# Patient Record
Sex: FEMALE | Race: BLACK OR AFRICAN AMERICAN | NOT HISPANIC OR LATINO | Employment: FULL TIME | URBAN - METROPOLITAN AREA
[De-identification: names, ages, dates, MRNs, and addresses within clinical notes are randomized per-mention and may not be internally consistent; named-entity substitution may affect disease eponyms.]

---

## 2016-11-15 LAB
EXTERNAL ABO GROUPING: NORMAL
EXTERNAL HEPATITIS B SURFACE ANTIGEN: NEGATIVE
EXTERNAL RH FACTOR: POSITIVE
EXTERNAL RUBELLA QUALITATIVE: NORMAL
EXTERNAL SYPHILIS RPR SCREEN: NORMAL
HIV1 AB SPEC QL IA.RAPID: NEGATIVE

## 2017-05-03 ENCOUNTER — HOSPITAL ENCOUNTER (INPATIENT)
Facility: HOSPITAL | Age: 22
LOS: 4 days | Discharge: HOME | End: 2017-05-07
Attending: OBSTETRICS & GYNECOLOGY | Admitting: OBSTETRICS & GYNECOLOGY

## 2017-05-03 ENCOUNTER — ANESTHESIA EVENT (OUTPATIENT)
Dept: LABOR AND DELIVERY | Facility: HOSPITAL | Age: 22
End: 2017-05-03

## 2017-05-03 ENCOUNTER — ANESTHESIA (OUTPATIENT)
Dept: LABOR AND DELIVERY | Facility: HOSPITAL | Age: 22
End: 2017-05-03

## 2017-05-03 DIAGNOSIS — R10.9 ABDOMINAL PAIN DURING PREGNANCY IN THIRD TRIMESTER: ICD-10-CM

## 2017-05-03 DIAGNOSIS — Z3A.36 36 WEEKS GESTATION OF PREGNANCY: ICD-10-CM

## 2017-05-03 DIAGNOSIS — O36.8390 FETAL HEART RATE DECELERATIONS AFFECTING MANAGEMENT OF MOTHER: ICD-10-CM

## 2017-05-03 DIAGNOSIS — O26.893 ABDOMINAL PAIN DURING PREGNANCY IN THIRD TRIMESTER: ICD-10-CM

## 2017-05-03 PROBLEM — Z34.90 PREGNANCY: Status: ACTIVE | Noted: 2017-05-03

## 2017-05-03 LAB
ABO GROUP BLD: NORMAL
ALBUMIN SERPL-MCNC: 3.5 G/DL (ref 3.5–5.2)
ALBUMIN/GLOB SERPL: 1.1 G/DL
ALP SERPL-CCNC: 120 U/L (ref 39–117)
ALT SERPL W P-5'-P-CCNC: 11 U/L (ref 1–33)
AMPHET+METHAMPHET UR QL: NEGATIVE
ANION GAP SERPL CALCULATED.3IONS-SCNC: 11.4 MMOL/L
APTT PPP: 26.1 SECONDS (ref 22.7–35.4)
AST SERPL-CCNC: 15 U/L (ref 1–32)
ATMOSPHERIC PRESS: 747.9 MMHG
ATMOSPHERIC PRESS: 752.3 MMHG
BARBITURATES UR QL SCN: NEGATIVE
BASE EXCESS BLDCOA CALC-SCNC: -4.8 MMOL/L
BASE EXCESS BLDCOV CALC-SCNC: -4.9 MMOL/L (ref -30–30)
BASOPHILS # BLD AUTO: 0.01 10*3/MM3 (ref 0–0.2)
BASOPHILS NFR BLD AUTO: 0.1 % (ref 0–1.5)
BDY SITE: ABNORMAL
BDY SITE: ABNORMAL
BENZODIAZ UR QL SCN: NEGATIVE
BILIRUB SERPL-MCNC: 0.6 MG/DL (ref 0.1–1.2)
BLD GP AB SCN SERPL QL: NEGATIVE
BUN BLD-MCNC: 6 MG/DL (ref 6–20)
BUN/CREAT SERPL: 11.1 (ref 7–25)
CALCIUM SPEC-SCNC: 9.1 MG/DL (ref 8.6–10.5)
CANNABINOIDS SERPL QL: NEGATIVE
CHLORIDE SERPL-SCNC: 104 MMOL/L (ref 98–107)
CO2 SERPL-SCNC: 23.6 MMOL/L (ref 22–29)
COCAINE UR QL: NEGATIVE
CREAT BLD-MCNC: 0.54 MG/DL (ref 0.57–1)
DEPRECATED RDW RBC AUTO: 47.5 FL (ref 37–54)
EOSINOPHIL # BLD AUTO: 0.09 10*3/MM3 (ref 0–0.7)
EOSINOPHIL NFR BLD AUTO: 0.9 % (ref 0.3–6.2)
ERYTHROCYTE [DISTWIDTH] IN BLOOD BY AUTOMATED COUNT: 14.1 % (ref 11.7–13)
FETAL RBC/RBC BLD FC-RTO: 0 %
FIBRINOGEN PPP-MCNC: 482 MG/DL (ref 219–464)
GAS FLOW AIRWAY: 2 LPM
GAS FLOW AIRWAY: 2 LPM
GFR SERPL CREATININE-BSD FRML MDRD: >150 ML/MIN/1.73
GLOBULIN UR ELPH-MCNC: 3.1 GM/DL
GLUCOSE BLD-MCNC: 86 MG/DL (ref 65–99)
HCO3 BLDCOA-SCNC: 25.1 MMOL/L (ref 22–28)
HCO3 BLDCOV-SCNC: 20.8 MMOL/L
HCT VFR BLD AUTO: 37.4 % (ref 35.6–45.5)
HGB BLD-MCNC: 12.4 G/DL (ref 11.9–15.5)
HGB F BLD QL KLEIH BETKE: NORMAL
IMM GRANULOCYTES # BLD: 0.02 10*3/MM3 (ref 0–0.03)
IMM GRANULOCYTES NFR BLD: 0.2 % (ref 0–0.5)
INR PPP: 0.97 (ref 0.9–1.1)
LYMPHOCYTES # BLD AUTO: 2.31 10*3/MM3 (ref 0.9–4.8)
LYMPHOCYTES NFR BLD AUTO: 22.7 % (ref 19.6–45.3)
MCH RBC QN AUTO: 30.7 PG (ref 26.9–32)
MCHC RBC AUTO-ENTMCNC: 33.2 G/DL (ref 32.4–36.3)
MCV RBC AUTO: 92.6 FL (ref 80.5–98.2)
METHADONE UR QL SCN: NEGATIVE
MODALITY: ABNORMAL
MODALITY: ABNORMAL
MONOCYTES # BLD AUTO: 1.01 10*3/MM3 (ref 0.2–1.2)
MONOCYTES NFR BLD AUTO: 9.9 % (ref 5–12)
NEUTROPHILS # BLD AUTO: 6.75 10*3/MM3 (ref 1.9–8.1)
NEUTROPHILS NFR BLD AUTO: 66.2 % (ref 42.7–76)
OPIATES UR QL: NEGATIVE
OXYCODONE UR QL SCN: NEGATIVE
PCO2 BLDCOA: 72.1 MMHG
PCO2 BLDCOV: 40.4 MM HG (ref 35–51.3)
PH BLDCOA: 7.15 [PH] (ref 7.18–7.34)
PH BLDCOV: 7.32 [PH] (ref 7.26–7.4)
PLATELET # BLD AUTO: 162 10*3/MM3 (ref 140–500)
PMV BLD AUTO: 11.8 FL (ref 6–12)
PO2 BLDCOA: <22 MMHG (ref 12–26)
PO2 BLDCOV: 34.4 MM HG (ref 19–39)
POTASSIUM BLD-SCNC: 3.7 MMOL/L (ref 3.5–5.2)
PROT SERPL-MCNC: 6.6 G/DL (ref 6–8.5)
PROTHROMBIN TIME: 12.5 SECONDS (ref 11.7–14.2)
RBC # BLD AUTO: 4.04 10*6/MM3 (ref 3.9–5.2)
RH BLD: POSITIVE
SAO2 % BLDCOA: 3.8 % (ref 92–99)
SAO2 % BLDCOA: 61.5 % (ref 92–99)
SAO2 % BLDCOV: ABNORMAL %
SODIUM BLD-SCNC: 139 MMOL/L (ref 136–145)
WBC NRBC COR # BLD: 10.19 10*3/MM3 (ref 4.5–10.7)

## 2017-05-03 PROCEDURE — 85460 HEMOGLOBIN FETAL: CPT | Performed by: OBSTETRICS & GYNECOLOGY

## 2017-05-03 PROCEDURE — 80307 DRUG TEST PRSMV CHEM ANLYZR: CPT | Performed by: OBSTETRICS & GYNECOLOGY

## 2017-05-03 PROCEDURE — 25010000002 ONDANSETRON PER 1 MG: Performed by: ANESTHESIOLOGY

## 2017-05-03 PROCEDURE — 86901 BLOOD TYPING SEROLOGIC RH(D): CPT | Performed by: OBSTETRICS & GYNECOLOGY

## 2017-05-03 PROCEDURE — 86900 BLOOD TYPING SEROLOGIC ABO: CPT | Performed by: OBSTETRICS & GYNECOLOGY

## 2017-05-03 PROCEDURE — 85730 THROMBOPLASTIN TIME PARTIAL: CPT | Performed by: OBSTETRICS & GYNECOLOGY

## 2017-05-03 PROCEDURE — 85025 COMPLETE CBC W/AUTO DIFF WBC: CPT | Performed by: OBSTETRICS & GYNECOLOGY

## 2017-05-03 PROCEDURE — 25010000002 PHENYLEPHRINE PER 1 ML: Performed by: ANESTHESIOLOGY

## 2017-05-03 PROCEDURE — 85610 PROTHROMBIN TIME: CPT | Performed by: OBSTETRICS & GYNECOLOGY

## 2017-05-03 PROCEDURE — 59514 CESAREAN DELIVERY ONLY: CPT | Performed by: OBSTETRICS & GYNECOLOGY

## 2017-05-03 PROCEDURE — 82803 BLOOD GASES ANY COMBINATION: CPT

## 2017-05-03 PROCEDURE — 85384 FIBRINOGEN ACTIVITY: CPT | Performed by: OBSTETRICS & GYNECOLOGY

## 2017-05-03 PROCEDURE — 80053 COMPREHEN METABOLIC PANEL: CPT | Performed by: OBSTETRICS & GYNECOLOGY

## 2017-05-03 PROCEDURE — S0260 H&P FOR SURGERY: HCPCS | Performed by: OBSTETRICS & GYNECOLOGY

## 2017-05-03 PROCEDURE — 25010000002 MORPHINE PER 10 MG

## 2017-05-03 PROCEDURE — 86850 RBC ANTIBODY SCREEN: CPT | Performed by: OBSTETRICS & GYNECOLOGY

## 2017-05-03 RX ORDER — OXYTOCIN/RINGER'S LACTATE 10/500ML
125 PLASTIC BAG, INJECTION (ML) INTRAVENOUS CONTINUOUS PRN
Status: CANCELLED | OUTPATIENT
Start: 2017-05-03 | End: 2017-05-04

## 2017-05-03 RX ORDER — CEFAZOLIN SODIUM 2 G/100ML
INJECTION, SOLUTION INTRAVENOUS
Status: DISPENSED
Start: 2017-05-03 | End: 2017-05-04

## 2017-05-03 RX ORDER — PRENATAL VIT NO.126/IRON/FOLIC 28MG-0.8MG
1 TABLET ORAL DAILY
COMMUNITY
End: 2017-08-17

## 2017-05-03 RX ORDER — DOCUSATE SODIUM 100 MG/1
100 CAPSULE, LIQUID FILLED ORAL 2 TIMES DAILY
COMMUNITY
End: 2017-08-17

## 2017-05-03 RX ORDER — ONDANSETRON 2 MG/ML
INJECTION INTRAMUSCULAR; INTRAVENOUS AS NEEDED
Status: DISCONTINUED | OUTPATIENT
Start: 2017-05-03 | End: 2017-05-03 | Stop reason: SURG

## 2017-05-03 RX ORDER — LIDOCAINE HYDROCHLORIDE 10 MG/ML
5 INJECTION, SOLUTION INFILTRATION; PERINEURAL AS NEEDED
Status: DISCONTINUED | OUTPATIENT
Start: 2017-05-03 | End: 2017-05-04

## 2017-05-03 RX ORDER — MORPHINE SULFATE 1 MG/ML
INJECTION, SOLUTION EPIDURAL; INTRATHECAL; INTRAVENOUS
Status: COMPLETED
Start: 2017-05-03 | End: 2017-05-03

## 2017-05-03 RX ORDER — ERYTHROMYCIN 5 MG/G
OINTMENT OPHTHALMIC
Status: DISPENSED
Start: 2017-05-03 | End: 2017-05-04

## 2017-05-03 RX ORDER — CEFAZOLIN SODIUM 2 G/100ML
2 INJECTION, SOLUTION INTRAVENOUS ONCE
Status: DISCONTINUED | OUTPATIENT
Start: 2017-05-03 | End: 2017-05-04

## 2017-05-03 RX ORDER — SODIUM CHLORIDE, SODIUM LACTATE, POTASSIUM CHLORIDE, CALCIUM CHLORIDE 600; 310; 30; 20 MG/100ML; MG/100ML; MG/100ML; MG/100ML
125 INJECTION, SOLUTION INTRAVENOUS CONTINUOUS
Status: DISCONTINUED | OUTPATIENT
Start: 2017-05-03 | End: 2017-05-04

## 2017-05-03 RX ORDER — OXYTOCIN/RINGER'S LACTATE 10/500ML
999 PLASTIC BAG, INJECTION (ML) INTRAVENOUS ONCE
Status: CANCELLED | OUTPATIENT
Start: 2017-05-03 | End: 2017-05-03

## 2017-05-03 RX ORDER — CARBOPROST TROMETHAMINE 250 UG/ML
250 INJECTION, SOLUTION INTRAMUSCULAR AS NEEDED
Status: CANCELLED | OUTPATIENT
Start: 2017-05-03

## 2017-05-03 RX ORDER — PHYTONADIONE 1 MG/.5ML
INJECTION, EMULSION INTRAMUSCULAR; INTRAVENOUS; SUBCUTANEOUS
Status: DISPENSED
Start: 2017-05-03 | End: 2017-05-04

## 2017-05-03 RX ORDER — MISOPROSTOL 200 UG/1
800 TABLET ORAL AS NEEDED
Status: CANCELLED | OUTPATIENT
Start: 2017-05-03

## 2017-05-03 RX ORDER — METHYLERGONOVINE MALEATE 0.2 MG/ML
200 INJECTION INTRAVENOUS ONCE AS NEEDED
Status: CANCELLED | OUTPATIENT
Start: 2017-05-03

## 2017-05-03 RX ORDER — OXYTOCIN/RINGER'S LACTATE 10/500ML
PLASTIC BAG, INJECTION (ML) INTRAVENOUS CONTINUOUS PRN
Status: DISCONTINUED | OUTPATIENT
Start: 2017-05-03 | End: 2017-05-03 | Stop reason: SURG

## 2017-05-03 RX ORDER — FENTANYL CITRATE 50 UG/ML
INJECTION, SOLUTION INTRAMUSCULAR; INTRAVENOUS
Status: DISPENSED
Start: 2017-05-03 | End: 2017-05-04

## 2017-05-03 RX ORDER — SODIUM CHLORIDE 0.9 % (FLUSH) 0.9 %
1-10 SYRINGE (ML) INJECTION AS NEEDED
Status: DISCONTINUED | OUTPATIENT
Start: 2017-05-03 | End: 2017-05-04

## 2017-05-03 RX ORDER — BUPIVACAINE HYDROCHLORIDE 7.5 MG/ML
INJECTION, SOLUTION EPIDURAL; RETROBULBAR AS NEEDED
Status: DISCONTINUED | OUTPATIENT
Start: 2017-05-03 | End: 2017-05-03 | Stop reason: SURG

## 2017-05-03 RX ORDER — FERROUS SULFATE 325(65) MG
325 TABLET ORAL
COMMUNITY
End: 2017-08-17

## 2017-05-03 RX ADMIN — SODIUM CHLORIDE, POTASSIUM CHLORIDE, SODIUM LACTATE AND CALCIUM CHLORIDE 1000 ML: 600; 310; 30; 20 INJECTION, SOLUTION INTRAVENOUS at 20:55

## 2017-05-03 RX ADMIN — EPHEDRINE SULFATE 5 MG: 50 INJECTION INTRAMUSCULAR; INTRAVENOUS; SUBCUTANEOUS at 21:46

## 2017-05-03 RX ADMIN — PHENYLEPHRINE HYDROCHLORIDE 100 MCG: 10 INJECTION INTRAVENOUS at 21:51

## 2017-05-03 RX ADMIN — EPHEDRINE SULFATE 5 MG: 50 INJECTION INTRAMUSCULAR; INTRAVENOUS; SUBCUTANEOUS at 21:51

## 2017-05-03 RX ADMIN — EPHEDRINE SULFATE 5 MG: 50 INJECTION INTRAMUSCULAR; INTRAVENOUS; SUBCUTANEOUS at 21:55

## 2017-05-03 RX ADMIN — PHENYLEPHRINE HYDROCHLORIDE 100 MCG: 10 INJECTION INTRAVENOUS at 21:42

## 2017-05-03 RX ADMIN — SODIUM CHLORIDE, POTASSIUM CHLORIDE, SODIUM LACTATE AND CALCIUM CHLORIDE: 600; 310; 30; 20 INJECTION, SOLUTION INTRAVENOUS at 21:17

## 2017-05-03 RX ADMIN — PHENYLEPHRINE HYDROCHLORIDE 100 MCG: 10 INJECTION INTRAVENOUS at 21:47

## 2017-05-03 RX ADMIN — BUPIVACAINE HYDROCHLORIDE 1.8 ML: 7.5 INJECTION, SOLUTION EPIDURAL; RETROBULBAR at 21:39

## 2017-05-03 RX ADMIN — ONDANSETRON 4 MG: 2 INJECTION INTRAMUSCULAR; INTRAVENOUS at 21:59

## 2017-05-03 RX ADMIN — MORPHINE SULFATE 0.3 MG: 1 INJECTION EPIDURAL; INTRATHECAL; INTRAVENOUS at 21:39

## 2017-05-03 RX ADMIN — OXYTOCIN 150 ML/HR: 10 INJECTION, SOLUTION INTRAMUSCULAR; INTRAVENOUS at 21:55

## 2017-05-04 LAB
BASOPHILS # BLD AUTO: 0.01 10*3/MM3 (ref 0–0.2)
BASOPHILS NFR BLD AUTO: 0.1 % (ref 0–1.5)
DEPRECATED RDW RBC AUTO: 47.4 FL (ref 37–54)
EOSINOPHIL # BLD AUTO: 0.04 10*3/MM3 (ref 0–0.7)
EOSINOPHIL NFR BLD AUTO: 0.3 % (ref 0.3–6.2)
ERYTHROCYTE [DISTWIDTH] IN BLOOD BY AUTOMATED COUNT: 14.1 % (ref 11.7–13)
HCT VFR BLD AUTO: 35.3 % (ref 35.6–45.5)
HGB BLD-MCNC: 11.6 G/DL (ref 11.9–15.5)
IMM GRANULOCYTES # BLD: 0.03 10*3/MM3 (ref 0–0.03)
IMM GRANULOCYTES NFR BLD: 0.2 % (ref 0–0.5)
LYMPHOCYTES # BLD AUTO: 2.52 10*3/MM3 (ref 0.9–4.8)
LYMPHOCYTES NFR BLD AUTO: 20.7 % (ref 19.6–45.3)
MCH RBC QN AUTO: 30.4 PG (ref 26.9–32)
MCHC RBC AUTO-ENTMCNC: 32.9 G/DL (ref 32.4–36.3)
MCV RBC AUTO: 92.4 FL (ref 80.5–98.2)
MONOCYTES # BLD AUTO: 0.87 10*3/MM3 (ref 0.2–1.2)
MONOCYTES NFR BLD AUTO: 7.2 % (ref 5–12)
NEUTROPHILS # BLD AUTO: 8.68 10*3/MM3 (ref 1.9–8.1)
NEUTROPHILS NFR BLD AUTO: 71.5 % (ref 42.7–76)
PLATELET # BLD AUTO: 157 10*3/MM3 (ref 140–500)
PMV BLD AUTO: 11.1 FL (ref 6–12)
RBC # BLD AUTO: 3.82 10*6/MM3 (ref 3.9–5.2)
WBC NRBC COR # BLD: 12.15 10*3/MM3 (ref 4.5–10.7)

## 2017-05-04 PROCEDURE — 85025 COMPLETE CBC W/AUTO DIFF WBC: CPT | Performed by: OBSTETRICS & GYNECOLOGY

## 2017-05-04 PROCEDURE — 63710000001 DIPHENHYDRAMINE PER 50 MG: Performed by: ANESTHESIOLOGY

## 2017-05-04 RX ORDER — MORPHINE SULFATE 2 MG/ML
2 INJECTION, SOLUTION INTRAMUSCULAR; INTRAVENOUS
Status: DISCONTINUED | OUTPATIENT
Start: 2017-05-04 | End: 2017-05-07 | Stop reason: HOSPADM

## 2017-05-04 RX ORDER — OXYTOCIN/RINGER'S LACTATE 10/500ML
125 PLASTIC BAG, INJECTION (ML) INTRAVENOUS CONTINUOUS PRN
Status: ACTIVE | OUTPATIENT
Start: 2017-05-04 | End: 2017-05-05

## 2017-05-04 RX ORDER — HYDROMORPHONE HYDROCHLORIDE 1 MG/ML
0.2 INJECTION, SOLUTION INTRAMUSCULAR; INTRAVENOUS; SUBCUTANEOUS
Status: DISCONTINUED | OUTPATIENT
Start: 2017-05-04 | End: 2017-05-07 | Stop reason: HOSPADM

## 2017-05-04 RX ORDER — DIPHENHYDRAMINE HCL 25 MG
25 CAPSULE ORAL EVERY 4 HOURS PRN
Status: DISCONTINUED | OUTPATIENT
Start: 2017-05-04 | End: 2017-05-07 | Stop reason: HOSPADM

## 2017-05-04 RX ORDER — DIPHENHYDRAMINE HYDROCHLORIDE 50 MG/ML
25 INJECTION INTRAMUSCULAR; INTRAVENOUS EVERY 4 HOURS PRN
Status: DISCONTINUED | OUTPATIENT
Start: 2017-05-04 | End: 2017-05-07 | Stop reason: HOSPADM

## 2017-05-04 RX ORDER — ONDANSETRON 2 MG/ML
4 INJECTION INTRAMUSCULAR; INTRAVENOUS EVERY 6 HOURS PRN
Status: DISCONTINUED | OUTPATIENT
Start: 2017-05-04 | End: 2017-05-07 | Stop reason: HOSPADM

## 2017-05-04 RX ORDER — OXYTOCIN/RINGER'S LACTATE 10/500ML
999 PLASTIC BAG, INJECTION (ML) INTRAVENOUS ONCE
Status: DISCONTINUED | OUTPATIENT
Start: 2017-05-04 | End: 2017-05-07 | Stop reason: HOSPADM

## 2017-05-04 RX ORDER — IBUPROFEN 600 MG/1
600 TABLET ORAL EVERY 8 HOURS PRN
Status: DISCONTINUED | OUTPATIENT
Start: 2017-05-04 | End: 2017-05-07 | Stop reason: HOSPADM

## 2017-05-04 RX ORDER — OXYCODONE HYDROCHLORIDE AND ACETAMINOPHEN 5; 325 MG/1; MG/1
2 TABLET ORAL EVERY 4 HOURS PRN
Status: DISCONTINUED | OUTPATIENT
Start: 2017-05-04 | End: 2017-05-07 | Stop reason: HOSPADM

## 2017-05-04 RX ORDER — LANOLIN 100 %
OINTMENT (GRAM) TOPICAL
Status: DISCONTINUED | OUTPATIENT
Start: 2017-05-04 | End: 2017-05-07 | Stop reason: HOSPADM

## 2017-05-04 RX ORDER — ONDANSETRON 2 MG/ML
4 INJECTION INTRAMUSCULAR; INTRAVENOUS ONCE AS NEEDED
Status: DISCONTINUED | OUTPATIENT
Start: 2017-05-04 | End: 2017-05-07 | Stop reason: HOSPADM

## 2017-05-04 RX ORDER — OXYTOCIN/RINGER'S LACTATE 10/500ML
PLASTIC BAG, INJECTION (ML) INTRAVENOUS
Status: DISPENSED
Start: 2017-05-04 | End: 2017-05-04

## 2017-05-04 RX ORDER — DOCUSATE SODIUM 100 MG/1
100 CAPSULE, LIQUID FILLED ORAL 2 TIMES DAILY PRN
Status: DISCONTINUED | OUTPATIENT
Start: 2017-05-04 | End: 2017-05-07 | Stop reason: HOSPADM

## 2017-05-04 RX ORDER — SIMETHICONE 80 MG
80 TABLET,CHEWABLE ORAL 4 TIMES DAILY PRN
Status: DISCONTINUED | OUTPATIENT
Start: 2017-05-04 | End: 2017-05-07 | Stop reason: HOSPADM

## 2017-05-04 RX ADMIN — DIPHENHYDRAMINE HYDROCHLORIDE 25 MG: 25 CAPSULE ORAL at 01:28

## 2017-05-04 RX ADMIN — SIMETHICONE CHEW TAB 80 MG 80 MG: 80 TABLET ORAL at 11:34

## 2017-05-04 RX ADMIN — OXYCODONE HYDROCHLORIDE AND ACETAMINOPHEN 2 TABLET: 5; 325 TABLET ORAL at 19:55

## 2017-05-04 RX ADMIN — DIPHENHYDRAMINE HYDROCHLORIDE 25 MG: 25 CAPSULE ORAL at 11:34

## 2017-05-04 RX ADMIN — IBUPROFEN 600 MG: 600 TABLET ORAL at 11:34

## 2017-05-04 RX ADMIN — OXYCODONE HYDROCHLORIDE AND ACETAMINOPHEN 2 TABLET: 5; 325 TABLET ORAL at 11:33

## 2017-05-04 RX ADMIN — DOCUSATE SODIUM 100 MG: 100 CAPSULE, LIQUID FILLED ORAL at 11:34

## 2017-05-04 RX ADMIN — IBUPROFEN 600 MG: 600 TABLET ORAL at 01:28

## 2017-05-04 RX ADMIN — IBUPROFEN 600 MG: 600 TABLET ORAL at 19:55

## 2017-05-05 PROCEDURE — 99232 SBSQ HOSP IP/OBS MODERATE 35: CPT | Performed by: OBSTETRICS & GYNECOLOGY

## 2017-05-05 RX ADMIN — OXYCODONE HYDROCHLORIDE AND ACETAMINOPHEN 2 TABLET: 5; 325 TABLET ORAL at 06:18

## 2017-05-05 RX ADMIN — IBUPROFEN 600 MG: 600 TABLET ORAL at 06:18

## 2017-05-05 RX ADMIN — OXYCODONE HYDROCHLORIDE AND ACETAMINOPHEN 2 TABLET: 5; 325 TABLET ORAL at 19:49

## 2017-05-05 RX ADMIN — MAGNESIUM HYDROXIDE 10 ML: 2400 SUSPENSION ORAL at 13:35

## 2017-05-05 RX ADMIN — SIMETHICONE CHEW TAB 80 MG 80 MG: 80 TABLET ORAL at 13:35

## 2017-05-05 RX ADMIN — SIMETHICONE CHEW TAB 80 MG 80 MG: 80 TABLET ORAL at 06:18

## 2017-05-05 RX ADMIN — DOCUSATE SODIUM 100 MG: 100 CAPSULE, LIQUID FILLED ORAL at 13:35

## 2017-05-05 RX ADMIN — IBUPROFEN 600 MG: 600 TABLET ORAL at 16:09

## 2017-05-05 RX ADMIN — SIMETHICONE CHEW TAB 80 MG 80 MG: 80 TABLET ORAL at 16:09

## 2017-05-05 RX ADMIN — OXYCODONE HYDROCHLORIDE AND ACETAMINOPHEN 2 TABLET: 5; 325 TABLET ORAL at 02:14

## 2017-05-06 PROCEDURE — 99232 SBSQ HOSP IP/OBS MODERATE 35: CPT | Performed by: OBSTETRICS & GYNECOLOGY

## 2017-05-06 RX ADMIN — IBUPROFEN 600 MG: 600 TABLET ORAL at 17:30

## 2017-05-06 RX ADMIN — IBUPROFEN 600 MG: 600 TABLET ORAL at 00:30

## 2017-05-06 RX ADMIN — IBUPROFEN 600 MG: 600 TABLET ORAL at 07:58

## 2017-05-06 RX ADMIN — OXYCODONE HYDROCHLORIDE AND ACETAMINOPHEN 2 TABLET: 5; 325 TABLET ORAL at 13:01

## 2017-05-06 RX ADMIN — SIMETHICONE CHEW TAB 80 MG 80 MG: 80 TABLET ORAL at 00:30

## 2017-05-06 RX ADMIN — OXYCODONE HYDROCHLORIDE AND ACETAMINOPHEN 1 TABLET: 5; 325 TABLET ORAL at 17:30

## 2017-05-06 RX ADMIN — SIMETHICONE CHEW TAB 80 MG 80 MG: 80 TABLET ORAL at 17:30

## 2017-05-06 RX ADMIN — DOCUSATE SODIUM 100 MG: 100 CAPSULE, LIQUID FILLED ORAL at 17:30

## 2017-05-06 RX ADMIN — DOCUSATE SODIUM 100 MG: 100 CAPSULE, LIQUID FILLED ORAL at 07:59

## 2017-05-06 RX ADMIN — SIMETHICONE CHEW TAB 80 MG 80 MG: 80 TABLET ORAL at 07:59

## 2017-05-06 RX ADMIN — OXYCODONE HYDROCHLORIDE AND ACETAMINOPHEN 2 TABLET: 5; 325 TABLET ORAL at 07:58

## 2017-05-06 RX ADMIN — OXYCODONE HYDROCHLORIDE AND ACETAMINOPHEN 2 TABLET: 5; 325 TABLET ORAL at 00:30

## 2017-05-07 VITALS
DIASTOLIC BLOOD PRESSURE: 68 MMHG | OXYGEN SATURATION: 97 % | RESPIRATION RATE: 18 BRPM | HEART RATE: 77 BPM | TEMPERATURE: 98.3 F | SYSTOLIC BLOOD PRESSURE: 110 MMHG

## 2017-05-07 PROBLEM — Z3A.36 36 WEEKS GESTATION OF PREGNANCY: Status: ACTIVE | Noted: 2017-05-03

## 2017-05-07 PROCEDURE — 99239 HOSP IP/OBS DSCHRG MGMT >30: CPT | Performed by: OBSTETRICS & GYNECOLOGY

## 2017-05-07 RX ORDER — IBUPROFEN 600 MG/1
600 TABLET ORAL EVERY 6 HOURS PRN
Qty: 30 TABLET | Refills: 1 | Status: SHIPPED | OUTPATIENT
Start: 2017-05-07 | End: 2017-08-17

## 2017-05-07 RX ORDER — OXYCODONE HYDROCHLORIDE AND ACETAMINOPHEN 5; 325 MG/1; MG/1
2 TABLET ORAL EVERY 4 HOURS PRN
Qty: 30 TABLET | Refills: 0 | Status: SHIPPED | OUTPATIENT
Start: 2017-05-07 | End: 2017-05-14

## 2017-05-07 RX ORDER — MEDROXYPROGESTERONE ACETATE 150 MG/ML
150 INJECTION, SUSPENSION INTRAMUSCULAR
Qty: 1 ML | Refills: 3 | Status: SHIPPED | OUTPATIENT
Start: 2017-05-07 | End: 2018-07-25 | Stop reason: SDUPTHER

## 2017-05-07 RX ORDER — MEDROXYPROGESTERONE ACETATE 150 MG/ML
150 INJECTION, SUSPENSION INTRAMUSCULAR ONCE
Status: COMPLETED | OUTPATIENT
Start: 2017-05-07 | End: 2017-05-07

## 2017-05-07 RX ADMIN — IBUPROFEN 600 MG: 600 TABLET ORAL at 08:39

## 2017-05-07 RX ADMIN — SIMETHICONE CHEW TAB 80 MG 80 MG: 80 TABLET ORAL at 08:42

## 2017-05-07 RX ADMIN — IBUPROFEN 600 MG: 600 TABLET ORAL at 01:09

## 2017-05-07 RX ADMIN — MEDROXYPROGESTERONE ACETATE 150 MG: 150 INJECTION, SUSPENSION INTRAMUSCULAR at 13:47

## 2017-05-07 RX ADMIN — DOCUSATE SODIUM 100 MG: 100 CAPSULE, LIQUID FILLED ORAL at 08:39

## 2017-05-26 ENCOUNTER — POSTPARTUM VISIT (OUTPATIENT)
Dept: OBSTETRICS AND GYNECOLOGY | Age: 22
End: 2017-05-26

## 2017-05-26 VITALS
DIASTOLIC BLOOD PRESSURE: 60 MMHG | WEIGHT: 176 LBS | HEIGHT: 63 IN | BODY MASS INDEX: 31.18 KG/M2 | SYSTOLIC BLOOD PRESSURE: 100 MMHG

## 2017-05-26 DIAGNOSIS — Z09 POSTOP CHECK: Primary | ICD-10-CM

## 2017-05-26 PROCEDURE — 99212 OFFICE O/P EST SF 10 MIN: CPT | Performed by: OBSTETRICS & GYNECOLOGY

## 2017-05-26 RX ORDER — OXYCODONE HYDROCHLORIDE AND ACETAMINOPHEN 5; 325 MG/1; MG/1
1 TABLET ORAL EVERY 6 HOURS PRN
COMMUNITY
End: 2017-06-13

## 2017-06-13 ENCOUNTER — POSTPARTUM VISIT (OUTPATIENT)
Dept: OBSTETRICS AND GYNECOLOGY | Age: 22
End: 2017-06-13

## 2017-06-13 VITALS
HEIGHT: 63 IN | DIASTOLIC BLOOD PRESSURE: 60 MMHG | WEIGHT: 179 LBS | SYSTOLIC BLOOD PRESSURE: 102 MMHG | BODY MASS INDEX: 31.71 KG/M2

## 2017-06-13 DIAGNOSIS — Z01.419 ENCOUNTER FOR GYNECOLOGICAL EXAMINATION: ICD-10-CM

## 2017-06-13 DIAGNOSIS — Z11.3 SCREEN FOR STD (SEXUALLY TRANSMITTED DISEASE): ICD-10-CM

## 2017-06-13 PROCEDURE — 99213 OFFICE O/P EST LOW 20 MIN: CPT | Performed by: OBSTETRICS & GYNECOLOGY

## 2017-06-13 NOTE — PROGRESS NOTES
"Annabella Seymour is a 21 y.o. female who presents for a postpartum visit. She is 6 weeks postpartum following a low cervical transverse  section. I have fully reviewed the prenatal and intrapartum course. The delivery was at 36 gestational weeks. Outcome: primary  section, low transverse incision. Anesthesia: spinal. Postpartum course has been uncomplicated. Baby's course has been stable, initially in NICU but now without issues. Baby is feeding by bottle - Neosure. Bleeding staining only. Bowel function is normal. Bladder function is normal. Patient is not sexually active. Contraception method is Depo-Provera injections. Postpartum depression screening: negative.    The following portions of the patient's history were reviewed and updated as appropriate: allergies, current medications, past family history, past medical history, past social history, past surgical history and problem list.    Review of Systems  A comprehensive review of systems was negative.except for GI: positive for mild constipation, improving now    Objective   /60  Ht 63\" (160 cm)  Wt 179 lb (81.2 kg)  LMP 2017  Breastfeeding? No  BMI 31.71 kg/m2   General:  alert, appears stated age, cooperative and no distress    Breasts:  inspection negative, no nipple discharge or bleeding, no masses or nodularity palpable   Lungs: clear to auscultation bilaterally   Heart:  regular rate and rhythm, S1, S2 normal, no murmur, click, rub or gallop   Abdomen: soft, non-tender; bowel sounds normal; no masses,  no organomegaly and incision healed without signs of infection    Vulva:  normal   Vagina: normal vagina, no discharge, exudate, lesion, or erythema   Cervix:  no lesions   Corpus: normal size, contour, position, consistency, mobility, non-tender   Adnexa:  normal adnexa and no mass, fullness, tenderness   Rectal Exam: Not performed.     Assessment/Plan   Normal postpartum exam. Pap smear done at today's " visit.    1. Contraception: Depo-Provera injections-pt plans to get injections with primary MD, advised pt she will be due in 6 weeks for next shot  2. Pt requests to return to work tomorrow, she reports she does not need to do any heavy lifting and feels she is ready to go back  3. Follow up in: 1 year or as needed.

## 2017-06-20 LAB
C TRACH RRNA CVX QL NAA+PROBE: NEGATIVE
CONV .: ABNORMAL
CYTOLOGIST CVX/VAG CYTO: ABNORMAL
CYTOLOGY CVX/VAG DOC THIN PREP: ABNORMAL
DX ICD CODE: ABNORMAL
DX ICD CODE: ABNORMAL
HIV 1 & 2 AB SER-IMP: ABNORMAL
HPV I/H RISK 4 DNA CVX QL PROBE+SIG AMP: NEGATIVE
N GONORRHOEA RRNA CVX QL NAA+PROBE: NEGATIVE
OTHER STN SPEC: ABNORMAL
PATH REPORT.FINAL DX SPEC: ABNORMAL
PATHOLOGIST CVX/VAG CYTO: ABNORMAL
RECOM F/U CVX/VAG CYTO: ABNORMAL
STAT OF ADQ CVX/VAG CYTO-IMP: ABNORMAL

## 2017-06-21 ENCOUNTER — TELEPHONE (OUTPATIENT)
Dept: OBSTETRICS AND GYNECOLOGY | Age: 22
End: 2017-06-21

## 2017-06-21 NOTE — TELEPHONE ENCOUNTER
Called pt and reviewed that pap showed ASCUS with negative HPV, pt aware of results, she reports her last pap was normal, advised that due to ascus, she needs repeat pap in one year and will need further evaluation if this remains atypical, she understands

## 2017-08-15 ENCOUNTER — TELEPHONE (OUTPATIENT)
Dept: OBSTETRICS AND GYNECOLOGY | Age: 22
End: 2017-08-15

## 2017-08-15 NOTE — TELEPHONE ENCOUNTER
Dr Cho pt, exp BV sx: vag d/c with an odor and irritation, requests to be seen. Work in with PA or NP?

## 2017-08-17 ENCOUNTER — OFFICE VISIT (OUTPATIENT)
Dept: OBSTETRICS AND GYNECOLOGY | Age: 22
End: 2017-08-17

## 2017-08-17 VITALS
BODY MASS INDEX: 32.6 KG/M2 | DIASTOLIC BLOOD PRESSURE: 70 MMHG | HEIGHT: 63 IN | SYSTOLIC BLOOD PRESSURE: 120 MMHG | WEIGHT: 184 LBS

## 2017-08-17 DIAGNOSIS — N76.0 ACUTE VAGINITIS: Primary | ICD-10-CM

## 2017-08-17 DIAGNOSIS — Z77.21 EXPOSURE TO BLOOD OR BODY FLUID: ICD-10-CM

## 2017-08-17 PROCEDURE — 99213 OFFICE O/P EST LOW 20 MIN: CPT | Performed by: NURSE PRACTITIONER

## 2017-08-17 RX ORDER — METRONIDAZOLE 7.5 MG/G
GEL VAGINAL DAILY
Qty: 1 TUBE | Refills: 0 | Status: SHIPPED | OUTPATIENT
Start: 2017-08-17 | End: 2017-08-22

## 2017-08-17 NOTE — PROGRESS NOTES
"Annabella Seymour is a 22 y.o. female is being seen today for   Chief Complaint   Patient presents with   • Vaginitis   .    History of Present Illness     This is a new patient to me.  She presents with a vaginal odor and some irritation.  She does state that she went to an urgent care a few weeks ago and was treated for a bacterial infection but was given flagyl orally and didn't tolerate it.  She didn't finish it.  She would like std testing today as well. She declines serum testing. She denies any itching or other concerns.  She requests a vaginal treatment for BV and would like to go ahead and start it if possible.      The following portions of the patient's history were reviewed and updated as appropriate: allergies, current medications, past family history, past medical history, past social history, past surgical history and problem list.    /70  Ht 63\" (160 cm)  Wt 184 lb (83.5 kg)  LMP 07/28/2017  BMI 32.59 kg/m2        Review of Systems   Constitutional: Negative.    HENT: Negative.    Eyes: Negative.    Respiratory: Negative.    Cardiovascular: Negative.    Gastrointestinal: Negative.    Endocrine: Negative.    Genitourinary: Positive for vaginal discharge. Negative for dyspareunia, dysuria, pelvic pain and vaginal bleeding.   Musculoskeletal: Negative.    Skin: Negative.    Allergic/Immunologic: Negative.    Neurological: Negative.    Hematological: Negative.    Psychiatric/Behavioral: Negative.        Objective   Physical Exam   Constitutional: She is oriented to person, place, and time. She appears well-developed and well-nourished.   Genitourinary: Uterus normal. Uterus is not tender. Cervix exhibits no motion tenderness, no discharge and no friability.   Genitourinary Comments: Moderate watery discharge noted, vagina otherwise normal.  No lesions.    Neurological: She is alert and oriented to person, place, and time.   Psychiatric: She has a normal mood and affect. Her behavior is " normal.         Assessment/Plan   Yen was seen today for vaginitis.    Diagnoses and all orders for this visit:    Acute vaginitis  -     NuSwab VG+    Exposure to blood or body fluid    Other orders  -     metroNIDAZOLE (METROGEL) 0.75 % vaginal gel; Insert  into the vagina Daily for 5 days.        Discussed vaginal hygiene and ways to avoid vaginal infections.  Also encouraged condoms.  Will call with culture results.

## 2017-08-18 NOTE — PROGRESS NOTES
I have reviewed the notes, assessments, and/or procedures performed by DUNCAN Dennis, I concur with her/his documentation of Yen Seymour.

## 2017-08-22 LAB
A VAGINAE DNA VAG QL NAA+PROBE: NORMAL SCORE
BVAB2 DNA VAG QL NAA+PROBE: NORMAL SCORE
C ALBICANS DNA VAG QL NAA+PROBE: NEGATIVE
C GLABRATA DNA VAG QL NAA+PROBE: NEGATIVE
C TRACH RRNA SPEC QL NAA+PROBE: NEGATIVE
MEGA1 DNA VAG QL NAA+PROBE: NORMAL SCORE
N GONORRHOEA RRNA SPEC QL NAA+PROBE: NEGATIVE
T VAGINALIS RRNA SPEC QL NAA+PROBE: NEGATIVE

## 2017-08-24 ENCOUNTER — TELEPHONE (OUTPATIENT)
Dept: OBSTETRICS AND GYNECOLOGY | Age: 22
End: 2017-08-24

## 2017-08-24 NOTE — TELEPHONE ENCOUNTER
----- Message from CRISTIAN Lea sent at 8/24/2017 12:22 PM EDT -----  Notify vaginal swab negative

## 2018-01-22 ENCOUNTER — OFFICE VISIT (OUTPATIENT)
Dept: OBSTETRICS AND GYNECOLOGY | Age: 23
End: 2018-01-22

## 2018-01-22 ENCOUNTER — TELEPHONE (OUTPATIENT)
Dept: OBSTETRICS AND GYNECOLOGY | Age: 23
End: 2018-01-22

## 2018-01-22 VITALS
DIASTOLIC BLOOD PRESSURE: 70 MMHG | HEIGHT: 63 IN | SYSTOLIC BLOOD PRESSURE: 124 MMHG | BODY MASS INDEX: 33.84 KG/M2 | WEIGHT: 191 LBS

## 2018-01-22 DIAGNOSIS — N90.89 LESION OF LABIA: ICD-10-CM

## 2018-01-22 DIAGNOSIS — N76.0 ACUTE VAGINITIS: Primary | ICD-10-CM

## 2018-01-22 DIAGNOSIS — Z11.3 SCREEN FOR STD (SEXUALLY TRANSMITTED DISEASE): ICD-10-CM

## 2018-01-22 PROCEDURE — 99213 OFFICE O/P EST LOW 20 MIN: CPT | Performed by: PHYSICIAN ASSISTANT

## 2018-01-22 RX ORDER — FLUCONAZOLE 150 MG/1
150 TABLET ORAL ONCE
Qty: 2 TABLET | Refills: 0 | Status: SHIPPED | OUTPATIENT
Start: 2018-01-22 | End: 2018-03-12 | Stop reason: SDUPTHER

## 2018-01-22 RX ORDER — METRONIDAZOLE 7.5 MG/G
GEL VAGINAL NIGHTLY
Qty: 1 TUBE | Refills: 1 | Status: SHIPPED | OUTPATIENT
Start: 2018-01-22 | End: 2018-04-17 | Stop reason: SDUPTHER

## 2018-01-22 NOTE — PROGRESS NOTES
"Subjective     Chief Complaint   Patient presents with   • Vaginitis       Yen Seymour is a 22 y.o.  whose LMP is No LMP recorded. Patient has had an injection. presents with bacterial vaginosis sx's. She has a h/o this and uses metrogel and diflucan to treat. She was also using the metrogel weekly to try to prevent her BV sx's.  She also douched with warm water.        No Additional Complaints Reported    The following portions of the patient's history were reviewed and updated as appropriate:vital signs, allergies, current medications, past family history, past medical history, past social history, past surgical history and problem list      Review of Systems   A comprehensive review of systems was negative except for: Genitourinary: positive for vaginal discharge     Objective      /70  Ht 160 cm (63\")  Wt 86.6 kg (191 lb)  Breastfeeding? No  BMI 33.83 kg/m2    Physical Exam    General:   alert, comfortable and no distress   Heart: Not performed today   Lungs: Not performed today.   Breast: Not performed today   Neck: na   Abdomen: {Not performed today   CVA: Not performed today   Pelvis: External genitalia: skin lesion noted on right labia majora, thick, stocky, benign appearin  Vaginal: normal mucosa without prolapse or lesions, scant white d/c noted, no odor, ph normal.Cervix: normal appearance   Extremities: Not performed today   Neurologic: negative   Psychiatric: Normal affect, judgement, and mood       Lab Review   Labs: No data reviewed     Imaging   No data reviewed    Assessment/Plan     ASSESSMENT  1. Acute vaginitis    2. Lesion of labia    3. Screen for STD (sexually transmitted disease)        PLAN  1. Based on hx, suspect BV, will send in meds to treat. She requests diflucan as well.   Will also do STD testing as well.      2. Medications prescribed this encounter:        New Medications Ordered This Visit   Medications   • fluconazole (DIFLUCAN) 150 MG tablet     Sig: Take 1 " tablet by mouth 1 (One) Time for 1 dose.     Dispense:  2 tablet     Refill:  0   • metroNIDAZOLE (METROGEL VAGINAL) 0.75 % vaginal gel     Sig: Insert  into the vagina Every Night.     Dispense:  1 tube     Refill:  1       3. Disc lesion. Benign in appearance. Would like it removed.  I would suggest having Dr Thomson remove it as it is stocky and a large lesion.  May require 1 suture.      Follow up: 4 week(s) for skin lesion removal with SARIKA Gallegos  1/22/2018

## 2018-02-02 LAB
A VAGINAE DNA VAG QL NAA+PROBE: ABNORMAL SCORE
BVAB2 DNA VAG QL NAA+PROBE: ABNORMAL SCORE
C ALBICANS DNA VAG QL NAA+PROBE: POSITIVE
C GLABRATA DNA VAG QL NAA+PROBE: NEGATIVE
C TRACH RRNA SPEC QL NAA+PROBE: NEGATIVE
MEGA1 DNA VAG QL NAA+PROBE: ABNORMAL SCORE
N GONORRHOEA RRNA SPEC QL NAA+PROBE: NEGATIVE
T VAGINALIS RRNA SPEC QL NAA+PROBE: NEGATIVE

## 2018-02-09 ENCOUNTER — CLINICAL SUPPORT (OUTPATIENT)
Dept: OBSTETRICS AND GYNECOLOGY | Age: 23
End: 2018-02-09

## 2018-02-09 DIAGNOSIS — Z30.9 ENCOUNTER FOR CONTRACEPTIVE MANAGEMENT, UNSPECIFIED TYPE: Primary | ICD-10-CM

## 2018-02-09 LAB
B-HCG UR QL: NEGATIVE
INTERNAL NEGATIVE CONTROL: NEGATIVE
INTERNAL POSITIVE CONTROL: POSITIVE
Lab: NORMAL

## 2018-02-09 PROCEDURE — 81025 URINE PREGNANCY TEST: CPT | Performed by: OBSTETRICS & GYNECOLOGY

## 2018-02-09 PROCEDURE — 96372 THER/PROPH/DIAG INJ SC/IM: CPT | Performed by: OBSTETRICS & GYNECOLOGY

## 2018-02-09 RX ORDER — MEDROXYPROGESTERONE ACETATE 150 MG/ML
150 INJECTION, SUSPENSION INTRAMUSCULAR ONCE
Status: COMPLETED | OUTPATIENT
Start: 2018-02-09 | End: 2018-02-09

## 2018-02-09 RX ADMIN — MEDROXYPROGESTERONE ACETATE 150 MG: 150 INJECTION, SUSPENSION INTRAMUSCULAR at 11:48

## 2018-02-19 ENCOUNTER — OFFICE VISIT (OUTPATIENT)
Dept: OBSTETRICS AND GYNECOLOGY | Age: 23
End: 2018-02-19

## 2018-02-19 VITALS
HEIGHT: 63 IN | DIASTOLIC BLOOD PRESSURE: 56 MMHG | SYSTOLIC BLOOD PRESSURE: 98 MMHG | WEIGHT: 191 LBS | BODY MASS INDEX: 33.84 KG/M2

## 2018-02-19 DIAGNOSIS — N90.89 VULVAR LESION: Primary | ICD-10-CM

## 2018-02-19 PROBLEM — R10.9 ABDOMINAL PAIN DURING PREGNANCY IN THIRD TRIMESTER: Status: RESOLVED | Noted: 2017-05-03 | Resolved: 2018-02-19

## 2018-02-19 PROBLEM — O26.893 ABDOMINAL PAIN DURING PREGNANCY IN THIRD TRIMESTER: Status: RESOLVED | Noted: 2017-05-03 | Resolved: 2018-02-19

## 2018-02-19 PROBLEM — Z3A.36 36 WEEKS GESTATION OF PREGNANCY: Status: RESOLVED | Noted: 2017-05-03 | Resolved: 2018-02-19

## 2018-02-19 PROBLEM — O36.8390 FETAL HEART RATE DECELERATIONS AFFECTING MANAGEMENT OF MOTHER: Status: RESOLVED | Noted: 2017-05-03 | Resolved: 2018-02-19

## 2018-02-19 PROCEDURE — 99212 OFFICE O/P EST SF 10 MIN: CPT | Performed by: OBSTETRICS & GYNECOLOGY

## 2018-02-19 NOTE — PROGRESS NOTES
"Chief complaint:vulvar lesion, pt requests removal     HPI  Yen Seymour is a 22 y.o. female. Pt presents for removal of skin tag on mons. Reports lesion has been present for years without change but she wants it removed now.        The following portions of the patient's history were reviewed and updated as appropriate: allergies, current medications, past family history, past medical history, past social history, past surgical history and problem list.    Review of Systems  Pertinent items are noted in HPI.    BP 98/56  Ht 160 cm (63\")  Wt 86.6 kg (191 lb)  BMI 33.83 kg/m2    Objective   Physical Exam   Constitutional: She appears well-developed and well-nourished.   Pulmonary/Chest: Effort normal.   Genitourinary:   Genitourinary Comments: Small 0.5 cm flesh-colored papule on right mons noted       Assessment/Plan   Yen was seen today for follow-up.    Diagnoses and all orders for this visit:    Vulvar lesion      Procedure:   Before proceeding, reviewed risks of excision to include infection, bleeding, damage to tissue, pain, need for additional procedures if area grows back of pathology normal. Pt verbally consents and signed form. Reviewed procedure with pt. Examined area and prepped with betadine swabs X 3. Began to inject site with lidocaine with epinephrine after advising pt and she began moving and reported pain. Pt notes that she did not anticipate need for anesthesia and thought it would be similar to depo shot only. Discussed with pt, she will consider coming back at another time for procedure or bringing family. Other option would be topical lidocaine application prior to excision. Pt will consider. She notes lesion has been present for \"years\" without change.     Plan f/u as needed or for annual. Again reviewed ASCUS pap with negative HPV with pt, advised she will need another pap with her annual in June, pt notes agreement.                "

## 2018-03-12 RX ORDER — FLUCONAZOLE 150 MG/1
TABLET ORAL
Qty: 2 TABLET | Refills: 0 | Status: SHIPPED | OUTPATIENT
Start: 2018-03-12 | End: 2018-10-17 | Stop reason: SDUPTHER

## 2018-04-17 ENCOUNTER — OFFICE VISIT (OUTPATIENT)
Dept: OBSTETRICS AND GYNECOLOGY | Age: 23
End: 2018-04-17

## 2018-04-17 VITALS
BODY MASS INDEX: 34.2 KG/M2 | WEIGHT: 193 LBS | HEIGHT: 63 IN | SYSTOLIC BLOOD PRESSURE: 100 MMHG | DIASTOLIC BLOOD PRESSURE: 60 MMHG

## 2018-04-17 DIAGNOSIS — N76.0 ACUTE VAGINITIS: Primary | ICD-10-CM

## 2018-04-17 DIAGNOSIS — Z77.21 EXPOSURE TO BLOOD OR BODY FLUID: ICD-10-CM

## 2018-04-17 PROCEDURE — 99213 OFFICE O/P EST LOW 20 MIN: CPT | Performed by: NURSE PRACTITIONER

## 2018-04-17 RX ORDER — METRONIDAZOLE 7.5 MG/G
GEL VAGINAL NIGHTLY
Qty: 1 TUBE | Refills: 1 | Status: SHIPPED | OUTPATIENT
Start: 2018-04-17 | End: 2018-10-05

## 2018-04-17 NOTE — PROGRESS NOTES
"Subjective   Yen Seymour is a 22 y.o. female is being seen today for   Chief Complaint   Patient presents with   • Vaginitis     Pt c/o vaginal odor   .    History of Present Illness      Patient here for vaginal discharge and odor.  She has a history of getting BV and requests to go ahead and start treatment prior to a culture.  She also would like STD testing, she declines serum testing. No itching or other complaints.  This is a new problem and she is a new patient to me.    The following portions of the patient's history were reviewed and updated as appropriate: allergies, current medications, past family history, past medical history, past social history, past surgical history and problem list.    /60   Ht 160 cm (63\")   Wt 87.5 kg (193 lb)   BMI 34.19 kg/m²       Review of Systems   Constitutional: Negative.    HENT: Negative.    Eyes: Negative.    Respiratory: Negative.    Cardiovascular: Negative.    Gastrointestinal: Negative.    Endocrine: Negative.    Genitourinary: Positive for vaginal discharge.   Musculoskeletal: Negative.    Skin: Negative.    Allergic/Immunologic: Negative.    Neurological: Negative.    Hematological: Negative.    Psychiatric/Behavioral: Negative.        Objective   Physical Exam   Constitutional: She is oriented to person, place, and time. She appears well-developed and well-nourished.   Genitourinary: Uterus normal. Uterus is not tender. Cervix exhibits no motion tenderness, no discharge and no friability.   Genitourinary Comments: Moderate watery discharge noted, vagina otherwise normal.  No lesions.    Neurological: She is alert and oriented to person, place, and time.   Psychiatric: She has a normal mood and affect. Her behavior is normal.         Assessment/Plan   Yen was seen today for vaginitis.    Diagnoses and all orders for this visit:    Acute vaginitis  -     NuSwab VG+ - Swab, Vagina    Other orders  -     metroNIDAZOLE (METROGEL VAGINAL) 0.75 % vaginal " gel; Insert  into the vagina Every Night.        Culture sent and will call with results.  Metrogel sent to pharmacy. Discussed avoidance of vaginal infections.

## 2018-04-20 ENCOUNTER — TELEPHONE (OUTPATIENT)
Dept: OBSTETRICS AND GYNECOLOGY | Age: 23
End: 2018-04-20

## 2018-04-20 NOTE — TELEPHONE ENCOUNTER
----- Message from SARIKA Quiroz sent at 4/20/2018 10:28 AM EDT -----  Let pt know her vaginal swab is neg for yeast, bv and std's

## 2018-05-16 ENCOUNTER — CLINICAL SUPPORT (OUTPATIENT)
Dept: OBSTETRICS AND GYNECOLOGY | Age: 23
End: 2018-05-16

## 2018-05-16 DIAGNOSIS — Z30.013 ENCOUNTER FOR INITIAL PRESCRIPTION OF INJECTABLE CONTRACEPTIVE: Primary | ICD-10-CM

## 2018-05-16 PROCEDURE — 96372 THER/PROPH/DIAG INJ SC/IM: CPT | Performed by: OBSTETRICS & GYNECOLOGY

## 2018-05-16 PROCEDURE — 81025 URINE PREGNANCY TEST: CPT | Performed by: OBSTETRICS & GYNECOLOGY

## 2018-05-16 RX ORDER — MEDROXYPROGESTERONE ACETATE 150 MG/ML
150 INJECTION, SUSPENSION INTRAMUSCULAR ONCE
Status: COMPLETED | OUTPATIENT
Start: 2018-05-16 | End: 2018-05-16

## 2018-05-16 RX ADMIN — MEDROXYPROGESTERONE ACETATE 150 MG: 150 INJECTION, SUSPENSION INTRAMUSCULAR at 11:35

## 2018-07-24 DIAGNOSIS — Z30.013 ENCOUNTER FOR INITIAL PRESCRIPTION OF INJECTABLE CONTRACEPTIVE: Primary | ICD-10-CM

## 2018-07-25 RX ORDER — MEDROXYPROGESTERONE ACETATE 150 MG/ML
INJECTION, SUSPENSION INTRAMUSCULAR
Qty: 1 ML | Refills: 3 | Status: SHIPPED | OUTPATIENT
Start: 2018-07-25 | End: 2019-03-08

## 2018-08-15 ENCOUNTER — TELEPHONE (OUTPATIENT)
Dept: OBSTETRICS AND GYNECOLOGY | Age: 23
End: 2018-08-15

## 2018-08-15 NOTE — TELEPHONE ENCOUNTER
Dr Cho pt, is to come in to get depo-provera inj, pt would like to be seen, she believes she has a UTI sx: vag itch, frequency, urgency, no pain with urination, no low back pain. Would you like to see if Annika could see? Please advise

## 2018-08-23 ENCOUNTER — CLINICAL SUPPORT (OUTPATIENT)
Dept: OBSTETRICS AND GYNECOLOGY | Age: 23
End: 2018-08-23

## 2018-08-23 DIAGNOSIS — R39.9 UTI SYMPTOMS: ICD-10-CM

## 2018-08-23 DIAGNOSIS — Z30.42 DEPO-PROVERA CONTRACEPTIVE STATUS: Primary | ICD-10-CM

## 2018-08-23 LAB
B-HCG UR QL: NEGATIVE
BILIRUB BLD-MCNC: NEGATIVE MG/DL
CLARITY, POC: CLEAR
COLOR UR: YELLOW
GLUCOSE UR STRIP-MCNC: NEGATIVE MG/DL
INTERNAL NEGATIVE CONTROL: NEGATIVE
INTERNAL POSITIVE CONTROL: POSITIVE
KETONES UR QL: NEGATIVE
LEUKOCYTE EST, POC: ABNORMAL
Lab: NORMAL
NITRITE UR-MCNC: NEGATIVE MG/ML
PH UR: 6.5 [PH] (ref 5–8)
PROT UR STRIP-MCNC: NEGATIVE MG/DL
RBC # UR STRIP: NEGATIVE /UL
SP GR UR: 1.02 (ref 1–1.03)
UROBILINOGEN UR QL: NORMAL

## 2018-08-23 PROCEDURE — 96372 THER/PROPH/DIAG INJ SC/IM: CPT | Performed by: PHYSICIAN ASSISTANT

## 2018-08-23 PROCEDURE — 81025 URINE PREGNANCY TEST: CPT | Performed by: PHYSICIAN ASSISTANT

## 2018-08-23 RX ORDER — MEDROXYPROGESTERONE ACETATE 150 MG/ML
150 INJECTION, SUSPENSION INTRAMUSCULAR ONCE
Status: COMPLETED | OUTPATIENT
Start: 2018-08-23 | End: 2018-08-23

## 2018-08-23 RX ADMIN — MEDROXYPROGESTERONE ACETATE 150 MG: 150 INJECTION, SUSPENSION INTRAMUSCULAR at 10:02

## 2018-08-23 NOTE — PROGRESS NOTES
Pt was given depo injection,  Pt brought meds   Lot number [id number]  Exp May 31, 2022  ndc nubmer [id number]    Pt was overdue for depo shot, pt assured me she has not had unprotected intercourse in the last 2 weeks and she did take a uhcg that was negative.    Pt was also c/o uti symptoms and requested that her urine be sent for a culture.

## 2018-08-25 LAB
BACTERIA UR CULT: NORMAL
BACTERIA UR CULT: NORMAL

## 2018-08-27 ENCOUNTER — TELEPHONE (OUTPATIENT)
Dept: OBSTETRICS AND GYNECOLOGY | Age: 23
End: 2018-08-27

## 2018-08-27 NOTE — TELEPHONE ENCOUNTER
----- Message from SARIKA Quiroz sent at 8/27/2018  9:05 AM EDT -----  Let her know her urine culture is negative for infection

## 2018-10-05 ENCOUNTER — TELEPHONE (OUTPATIENT)
Dept: OBSTETRICS AND GYNECOLOGY | Age: 23
End: 2018-10-05

## 2018-10-05 RX ORDER — METRONIDAZOLE 7.5 MG/G
GEL VAGINAL 2 TIMES DAILY
Qty: 1 TUBE | Refills: 0 | Status: SHIPPED | OUTPATIENT
Start: 2018-10-05 | End: 2018-10-10

## 2018-10-17 ENCOUNTER — TELEPHONE (OUTPATIENT)
Dept: OBSTETRICS AND GYNECOLOGY | Age: 23
End: 2018-10-17

## 2018-10-17 RX ORDER — FLUCONAZOLE 150 MG/1
150 TABLET ORAL ONCE
Qty: 2 TABLET | Refills: 0 | Status: SHIPPED | OUTPATIENT
Start: 2018-10-17 | End: 2018-10-17

## 2018-10-17 NOTE — TELEPHONE ENCOUNTER
Gyn pt c/o yeast infection, white discharge and vaginal itching. Pt was recently on meds for BV. Pt is requesting Diflucan be sent to pharmacy.

## 2018-11-16 ENCOUNTER — CLINICAL SUPPORT (OUTPATIENT)
Dept: OBSTETRICS AND GYNECOLOGY | Age: 23
End: 2018-11-16

## 2018-11-16 DIAGNOSIS — Z30.42 ENCOUNTER FOR SURVEILLANCE OF INJECTABLE CONTRACEPTIVE: Primary | ICD-10-CM

## 2018-11-16 PROCEDURE — 81025 URINE PREGNANCY TEST: CPT | Performed by: OBSTETRICS & GYNECOLOGY

## 2018-11-16 PROCEDURE — 96372 THER/PROPH/DIAG INJ SC/IM: CPT | Performed by: OBSTETRICS & GYNECOLOGY

## 2018-11-16 RX ORDER — MEDROXYPROGESTERONE ACETATE 150 MG/ML
150 INJECTION, SUSPENSION INTRAMUSCULAR ONCE
Status: COMPLETED | OUTPATIENT
Start: 2018-11-16 | End: 2018-11-16

## 2018-11-16 RX ADMIN — MEDROXYPROGESTERONE ACETATE 150 MG: 150 INJECTION, SUSPENSION INTRAMUSCULAR at 09:31

## 2018-11-29 ENCOUNTER — OFFICE VISIT (OUTPATIENT)
Dept: OBSTETRICS AND GYNECOLOGY | Age: 23
End: 2018-11-29

## 2018-11-29 VITALS
HEIGHT: 63 IN | WEIGHT: 207 LBS | DIASTOLIC BLOOD PRESSURE: 64 MMHG | BODY MASS INDEX: 36.68 KG/M2 | SYSTOLIC BLOOD PRESSURE: 110 MMHG

## 2018-11-29 DIAGNOSIS — N76.0 ACUTE VAGINITIS: Primary | ICD-10-CM

## 2018-11-29 PROCEDURE — 99213 OFFICE O/P EST LOW 20 MIN: CPT | Performed by: NURSE PRACTITIONER

## 2018-11-29 RX ORDER — CLINDAMYCIN PHOSPHATE 20 MG/G
1 CREAM VAGINAL NIGHTLY
Qty: 40 G | Refills: 0 | Status: SHIPPED | OUTPATIENT
Start: 2018-11-29 | End: 2018-12-06

## 2018-11-29 NOTE — PROGRESS NOTES
"Subjective   Yen Seymour is a 23 y.o. female is being seen today for   Chief Complaint   Patient presents with   • Vaginitis     Pt c/o vaginal odor. Denies itching or discharge.    .    History of Present Illness     Patient here for vaginal odor that she notices after IC.  She used Metrogel two weeks ago and the odor went away but has come back and she still notices an odor after IC every time. She would like additional treatment and would like STD testing today (declines serum).  She has one partner and they do not use condoms.     The following portions of the patient's history were reviewed and updated as appropriate: allergies, current medications, past family history, past medical history, past social history, past surgical history and problem list.    /64   Ht 160 cm (63\")   Wt 93.9 kg (207 lb)   BMI 36.67 kg/m²         Review of Systems   Constitutional: Negative.    HENT: Negative.    Eyes: Negative.    Respiratory: Negative.    Cardiovascular: Negative.    Gastrointestinal: Negative.    Endocrine: Negative.    Genitourinary: Positive for vaginal discharge.   Musculoskeletal: Negative.    Skin: Negative.    Allergic/Immunologic: Negative.    Neurological: Negative.    Hematological: Negative.    Psychiatric/Behavioral: Negative.        Objective   Physical Exam   Constitutional: She is oriented to person, place, and time. She appears well-developed and well-nourished.   Genitourinary: Uterus normal. Uterus is not tender. Cervix exhibits no motion tenderness, no discharge and no friability.   Genitourinary Comments: Moderate watery discharge noted, vagina otherwise normal.  No lesions.    Neurological: She is alert and oriented to person, place, and time.   Psychiatric: She has a normal mood and affect. Her behavior is normal.         Assessment/Plan   Yen was seen today for vaginitis.    Diagnoses and all orders for this visit:    Acute vaginitis  -     NuSwab VG+ - Swab, Vagina        Exam " and symptoms consistent with BV.  Patient requests treatment today.  She also would like diflucan sent in.  She states she always gets yeast infections following antibiotics. Encouraged probiotics and discussed prevention strategies for bacterial infections.  Also encouraged condoms.  Will call with culture results.

## 2018-12-04 ENCOUNTER — TELEPHONE (OUTPATIENT)
Dept: OBSTETRICS AND GYNECOLOGY | Age: 23
End: 2018-12-04

## 2018-12-04 NOTE — TELEPHONE ENCOUNTER
----- Message from CRISTIAN Lea sent at 12/4/2018  9:24 AM EST -----  Notify negative vaginal culture

## 2018-12-06 ENCOUNTER — TELEPHONE (OUTPATIENT)
Dept: OBSTETRICS AND GYNECOLOGY | Age: 23
End: 2018-12-06

## 2018-12-06 RX ORDER — FLUCONAZOLE 150 MG/1
150 TABLET ORAL
Qty: 2 TABLET | Refills: 0 | Status: SHIPPED | OUTPATIENT
Start: 2018-12-06 | End: 2019-06-18

## 2019-02-27 ENCOUNTER — TELEPHONE (OUTPATIENT)
Dept: OBSTETRICS AND GYNECOLOGY | Age: 24
End: 2019-02-27

## 2019-02-27 DIAGNOSIS — N92.6 IRREGULAR MENSES: Primary | ICD-10-CM

## 2019-02-28 LAB — HCG INTACT+B SERPL-ACNC: <0.5 MIU/ML

## 2019-03-01 ENCOUNTER — CLINICAL SUPPORT (OUTPATIENT)
Dept: OBSTETRICS AND GYNECOLOGY | Age: 24
End: 2019-03-01

## 2019-03-01 DIAGNOSIS — Z30.42 ON DEPO-PROVERA FOR CONTRACEPTION: Primary | ICD-10-CM

## 2019-03-01 PROCEDURE — 96372 THER/PROPH/DIAG INJ SC/IM: CPT | Performed by: PHYSICIAN ASSISTANT

## 2019-03-01 RX ORDER — MEDROXYPROGESTERONE ACETATE 150 MG/ML
150 INJECTION, SUSPENSION INTRAMUSCULAR ONCE
Status: COMPLETED | OUTPATIENT
Start: 2019-03-01 | End: 2019-03-01

## 2019-03-01 RX ADMIN — MEDROXYPROGESTERONE ACETATE 150 MG: 150 INJECTION, SUSPENSION INTRAMUSCULAR at 11:07

## 2019-03-08 ENCOUNTER — OFFICE VISIT (OUTPATIENT)
Dept: OBSTETRICS AND GYNECOLOGY | Age: 24
End: 2019-03-08

## 2019-03-08 VITALS
WEIGHT: 206.8 LBS | HEIGHT: 63 IN | DIASTOLIC BLOOD PRESSURE: 70 MMHG | SYSTOLIC BLOOD PRESSURE: 120 MMHG | BODY MASS INDEX: 36.64 KG/M2

## 2019-03-08 DIAGNOSIS — Z87.39 HISTORY OF OSTEOPENIA: ICD-10-CM

## 2019-03-08 DIAGNOSIS — Z11.3 SCREENING EXAMINATION FOR STD (SEXUALLY TRANSMITTED DISEASE): ICD-10-CM

## 2019-03-08 DIAGNOSIS — E66.01 MORBIDLY OBESE (HCC): ICD-10-CM

## 2019-03-08 DIAGNOSIS — R87.610 ASCUS OF CERVIX WITH NEGATIVE HIGH RISK HPV: ICD-10-CM

## 2019-03-08 DIAGNOSIS — Z01.419 ENCOUNTER FOR GYNECOLOGICAL EXAMINATION: Primary | ICD-10-CM

## 2019-03-08 PROCEDURE — 99395 PREV VISIT EST AGE 18-39: CPT | Performed by: OBSTETRICS & GYNECOLOGY

## 2019-03-08 RX ORDER — ERGOCALCIFEROL 1.25 MG/1
CAPSULE ORAL
COMMUNITY
Start: 2018-12-27

## 2019-03-08 RX ORDER — LINACLOTIDE 72 UG/1
CAPSULE, GELATIN COATED ORAL
COMMUNITY
Start: 2018-12-22 | End: 2021-02-05

## 2019-03-08 NOTE — PROGRESS NOTES
"Subjective     Chief Complaint   Patient presents with   • Gynecologic Exam     AE  Last pap 17 ASCUS, HPV-       History of Present Illness    Yen Seymour is a 23 y.o.  who presents for annual exam.  Pt does not have menses with depo-provera use; she denies any current issues    Obstetric History:  OB History      Para Term  AB Living    3 2 1 1 1 2    SAB TAB Ectopic Molar Multiple Live Births            0 2         Menstrual History:     No LMP recorded (lmp unknown). Patient has had an injection.         Current contraception: Depo-Provera injections  History of abnormal Pap smear: yes - ascus hpv neg   Received Gardasil immunization: yes - all 3  Perform regular self breast exam: no  Family history of uterine or ovarian cancer: no  Family History of colon cancer: no  Family history of breast cancer: no    Mammogram: not indicated.  Colonoscopy: not indicated.  DEXA: not indicated.    Exercise: moderately active  Calcium/Vitamin D: adequate intake    The following portions of the patient's history were reviewed and updated as appropriate: allergies, current medications, past family history, past medical history, past social history, past surgical history and problem list.    Review of Systems    Review of Systems   Constitutional: Negative for fatigue.   Respiratory: Negative for shortness of breath.    Gastrointestinal: Negative for abdominal pain.   Genitourinary: Negative for dysuria. neg for vag bleeding with depo  Neurological: Negative for headaches.   Psychiatric/Behavioral: Negative for dysphoric mood.         Objective   Physical Exam    /70   Ht 160 cm (63\")   Wt 93.8 kg (206 lb 12.8 oz)   LMP  (LMP Unknown) Comment: Depo Provera  Breastfeeding? No   BMI 36.63 kg/m²     General:   alert, appears stated age, cooperative and no distress   Neck: no asymmetry, masses, or scars and thyroid normal to palpation   Heart: regular rate and rhythm, S1, S2 normal, no " murmur, click, rub or gallop   Lungs: clear to auscultation bilaterally   Abdomen: soft, non-tender, without masses or organomegaly   Breast: inspection negative, no nipple discharge or bleeding, no masses or nodularity palpable and no axillary adenopathy   Vulva: normal, Bartholin's, Urethra, [place]'s normal   Vagina: normal mucosa, normal discharge   Cervix: no lesions   Uterus: normal size, mobile, non-tender, normal shape and consistency   Adnexa: normal adnexa and no mass, fullness, tenderness   Rectal: not indicated     Assessment/Plan   Yen was seen today for gynecologic exam.    Diagnoses and all orders for this visit:    Encounter for gynecological examination  -     IGP, CtNg, Rfx Aptima HPV ASCU    Morbidly obese (CMS/HCC)    ASCUS of cervix with negative high risk HPV  -     IGP, CtNg, Rfx Aptima HPV ASCU    History of osteopenia    Screening examination for STD (sexually transmitted disease)  -     IGP, CtNg, Rfx Aptima HPV ASCU        All questions answered.  Breast self exam technique reviewed and patient encouraged to perform self-exam monthly.  Discussed healthy lifestyle modifications.  Recommended 30 minutes of aerobic exercise five times per week.  Discussed calcium/ Vit D needs to prevent osteoporosis. Recommended she stop depo-provera. Records from [facility] available and pt has hx of osteopenia in the past. She was advised to stop depoprovera several years ago. She notes she did stop but desired to use postpartum. She has been on for 2 years this time so recommend she discontinue given hx of bone loss noted prior. Gave information on alternative contraceptive methods    Prior ASCUS hpv neg pap: pap repeated today

## 2019-03-13 LAB
C TRACH RRNA CVX QL NAA+PROBE: NEGATIVE
CONV .: NORMAL
CYTOLOGIST CVX/VAG CYTO: NORMAL
CYTOLOGY CVX/VAG DOC THIN PREP: NORMAL
DX ICD CODE: NORMAL
HIV 1 & 2 AB SER-IMP: NORMAL
Lab: NORMAL
N GONORRHOEA RRNA CVX QL NAA+PROBE: NEGATIVE
OTHER STN SPEC: NORMAL
PATH REPORT.FINAL DX SPEC: NORMAL
STAT OF ADQ CVX/VAG CYTO-IMP: NORMAL

## 2019-03-14 ENCOUNTER — TELEPHONE (OUTPATIENT)
Dept: OBSTETRICS AND GYNECOLOGY | Age: 24
End: 2019-03-14

## 2019-03-14 NOTE — TELEPHONE ENCOUNTER
----- Message from Sharon Thomson MD sent at 3/13/2019  4:22 PM EDT -----  Call Yen, her pap and CZ/GC screens are negative/normal

## 2019-06-12 ENCOUNTER — TELEPHONE (OUTPATIENT)
Dept: OBSTETRICS AND GYNECOLOGY | Age: 24
End: 2019-06-12

## 2019-06-13 ENCOUNTER — OFFICE VISIT (OUTPATIENT)
Dept: OBSTETRICS AND GYNECOLOGY | Age: 24
End: 2019-06-13

## 2019-06-13 VITALS
BODY MASS INDEX: 35.08 KG/M2 | WEIGHT: 198 LBS | SYSTOLIC BLOOD PRESSURE: 112 MMHG | DIASTOLIC BLOOD PRESSURE: 70 MMHG | HEIGHT: 63 IN

## 2019-06-13 DIAGNOSIS — Z77.21 EXPOSURE TO BLOOD OR BODY FLUID: ICD-10-CM

## 2019-06-13 DIAGNOSIS — B00.9 HSV (HERPES SIMPLEX VIRUS) INFECTION: ICD-10-CM

## 2019-06-13 DIAGNOSIS — N76.0 ACUTE VAGINITIS: Primary | ICD-10-CM

## 2019-06-13 PROCEDURE — 99214 OFFICE O/P EST MOD 30 MIN: CPT | Performed by: NURSE PRACTITIONER

## 2019-06-13 RX ORDER — METRONIDAZOLE 7.5 MG/G
GEL VAGINAL DAILY
Qty: 70 G | Refills: 0 | Status: SHIPPED | OUTPATIENT
Start: 2019-06-13 | End: 2019-06-18

## 2019-06-13 RX ORDER — NORETHINDRONE ACETATE AND ETHINYL ESTRADIOL 1; .02 MG/1; MG/1
1 TABLET ORAL DAILY
COMMUNITY

## 2019-06-13 RX ORDER — VALACYCLOVIR HYDROCHLORIDE 1 G/1
1000 TABLET, FILM COATED ORAL 2 TIMES DAILY
Qty: 10 TABLET | Refills: 2 | Status: SHIPPED | OUTPATIENT
Start: 2019-06-13 | End: 2019-06-18

## 2019-06-13 NOTE — PROGRESS NOTES
"Annabella Seymour is a 23 y.o. female is being seen today for   Chief Complaint   Patient presents with   • Gynecologic Exam     cc: odor ,discharge , itching    .    History of Present Illness     Patient here for discharge that started this week  She is also noticing a slight itch and odor  She believes it is BV but also wants STD testing  She also has a history of HSV and requests valtrex for current symptoms, she thinks she is starting to have an outbreak   She has an area of irritation and rash on her right butt cheek, no open lesions yet,  states this is where she gets outbreaks  She states she was diagnosed through culture and blood work at another office, I do not have those records today  She recently switched from Depo to Junel 1/20 due to osteopenia, she is not having regular cycles.  No missed pills    The following portions of the patient's history were reviewed and updated as appropriate: allergies, current medications, past family history, past medical history, past social history, past surgical history and problem list.    /70   Ht 160 cm (63\")   Wt 89.8 kg (198 lb)   LMP  (LMP Unknown)   Breastfeeding? No   BMI 35.07 kg/m²         Review of Systems   Constitutional: Negative.    HENT: Negative.    Eyes: Negative.    Respiratory: Negative.    Cardiovascular: Negative.    Gastrointestinal: Negative.    Endocrine: Negative.    Genitourinary: Positive for vaginal discharge.        Vaginal odor     Musculoskeletal: Negative.    Skin: Negative.    Allergic/Immunologic: Negative.    Neurological: Negative.    Hematological: Negative.    Psychiatric/Behavioral: Negative.        Objective   Physical Exam   Constitutional: She is oriented to person, place, and time. She appears well-developed and well-nourished.   Genitourinary: Uterus normal. Uterus is not tender. Cervix exhibits no motion tenderness, no discharge and no friability.   Genitourinary Comments: Moderate watery discharge " noted, vagina otherwise normal.    Small red are on right buttocks, cluster of small vesicles, no open lesions   Neurological: She is alert and oriented to person, place, and time.   Psychiatric: She has a normal mood and affect. Her behavior is normal.         Assessment/Plan   Yen was seen today for gynecologic exam.    Diagnoses and all orders for this visit:    Acute vaginitis  -     NuSwab VG+ - Swab, Vagina    Exposure to blood or body fluid  -     NuSwab VG+ - Swab, Vagina  -     RPR, Rfx Qn RPR / Confirm TP  -     Hepatitis B Surface Antigen  -     Hepatitis C Antibody  -     HIV-1 / O / 2 Ag / Antibody 4th Generation    HSV (herpes simplex virus) infection    Other orders  -     valACYclovir (VALTREX) 1000 MG tablet; Take 1 tablet by mouth 2 (Two) Times a Day for 5 days.  -     metroNIDAZOLE (METROGEL VAGINAL) 0.75 % vaginal gel; Insert  into the vagina Daily for 5 days.    Exam consistent with bacterial vaginosis.  Patient requests metrogel prior to the weekend, she is going out of town. Will check vaginal culture and STD panel. Encouraged condoms.  Valtrex for outbreak symptoms sent to pharmacy as well. Patient did not leave a urine sample today- encouraged to check UCG if no cycle at end of this pack

## 2019-06-14 ENCOUNTER — TELEPHONE (OUTPATIENT)
Dept: OBSTETRICS AND GYNECOLOGY | Age: 24
End: 2019-06-14

## 2019-06-14 LAB
HBV SURFACE AG SERPL QL IA: NEGATIVE
HCV AB S/CO SERPL IA: 0.1 S/CO RATIO (ref 0–0.9)
HIV 1+2 AB+HIV1 P24 AG SERPL QL IA: NON REACTIVE
RPR SER QL: NON REACTIVE

## 2019-06-14 NOTE — TELEPHONE ENCOUNTER
----- Message from SARIKA Quiroz sent at 6/14/2019  9:08 AM EDT -----  Let pt know her serum std results are negative

## 2019-06-18 ENCOUNTER — TELEPHONE (OUTPATIENT)
Dept: OBSTETRICS AND GYNECOLOGY | Age: 24
End: 2019-06-18

## 2019-06-18 RX ORDER — FLUCONAZOLE 150 MG/1
150 TABLET ORAL ONCE
Qty: 2 TABLET | Refills: 0 | Status: SHIPPED | OUTPATIENT
Start: 2019-06-18 | End: 2019-06-18

## 2019-06-18 RX ORDER — FLUCONAZOLE 150 MG/1
150 TABLET ORAL
Qty: 2 TABLET | Refills: 0 | Status: SHIPPED | OUTPATIENT
Start: 2019-06-18 | End: 2021-02-05

## 2019-08-26 ENCOUNTER — DOCUMENTATION (OUTPATIENT)
Dept: OBSTETRICS AND GYNECOLOGY | Age: 24
End: 2019-08-26

## 2019-08-26 NOTE — PROGRESS NOTES
Our office received a request for Medroxyprogestone.  Spoke with patient, and she says that she is no longer on this medication.  Pharmacy was faxed request with denial and DC note attached.

## 2019-10-28 RX ORDER — CLINDAMYCIN PHOSPHATE 20 MG/G
CREAM VAGINAL
Qty: 40 G | Refills: 0 | OUTPATIENT
Start: 2019-10-28

## 2019-10-28 RX ORDER — METRONIDAZOLE 7.5 MG/G
GEL VAGINAL
Qty: 70 G | Refills: 0 | OUTPATIENT
Start: 2019-10-28

## 2020-06-30 RX ORDER — VALACYCLOVIR HYDROCHLORIDE 1 G/1
TABLET, FILM COATED ORAL
Qty: 10 TABLET | Refills: 2 | OUTPATIENT
Start: 2020-06-30

## 2021-02-04 ENCOUNTER — TELEPHONE (OUTPATIENT)
Dept: OBSTETRICS AND GYNECOLOGY | Age: 26
End: 2021-02-04

## 2021-02-04 NOTE — TELEPHONE ENCOUNTER
Alix pt    Pt called and stated that she wants a pelvic u/s. Pt stated that she is having lower abdominal pain. Pt stated she also feel some kind of pressure in her pelvic area. Pt stated that she went to urgent care center yesterday. Please advise    [phone]

## 2021-02-05 ENCOUNTER — OFFICE VISIT (OUTPATIENT)
Dept: OBSTETRICS AND GYNECOLOGY | Age: 26
End: 2021-02-05

## 2021-02-05 VITALS
DIASTOLIC BLOOD PRESSURE: 72 MMHG | BODY MASS INDEX: 34.82 KG/M2 | HEIGHT: 65 IN | SYSTOLIC BLOOD PRESSURE: 112 MMHG | WEIGHT: 209 LBS

## 2021-02-05 DIAGNOSIS — Z13.89 SCREENING FOR BLOOD OR PROTEIN IN URINE: ICD-10-CM

## 2021-02-05 DIAGNOSIS — R10.2 PELVIC PRESSURE IN FEMALE: Primary | ICD-10-CM

## 2021-02-05 LAB
BILIRUB BLD-MCNC: NEGATIVE MG/DL
CLARITY, POC: CLEAR
COLOR UR: YELLOW
GLUCOSE UR STRIP-MCNC: NEGATIVE MG/DL
KETONES UR QL: NEGATIVE
LEUKOCYTE EST, POC: NEGATIVE
NITRITE UR-MCNC: NEGATIVE MG/ML
PH UR: 6 [PH] (ref 5–8)
PROT UR STRIP-MCNC: NEGATIVE MG/DL
RBC # UR STRIP: NEGATIVE /UL
SP GR UR: 1.03 (ref 1–1.03)
UROBILINOGEN UR QL: NORMAL

## 2021-02-05 PROCEDURE — 99213 OFFICE O/P EST LOW 20 MIN: CPT | Performed by: NURSE PRACTITIONER

## 2021-02-05 NOTE — PROGRESS NOTES
I have reviewed the notes, assessments, and/or procedures performed by CRISTIAN Matamoros, I concur with her/his documentation of Yen Seymour.

## 2021-02-05 NOTE — PROGRESS NOTES
"Subjective     Chief Complaint   Patient presents with   • Gynecologic Exam     Lower abdominal pain for over a month       Yen Seymour is a 25 y.o.  whose LMP is Patient's last menstrual period was 01/15/2021 (exact date).     Pt presents today with chief complaint of lower abdominal pain x 1 month  States it feels more like pressure but sometimes painful and it is constant but worse when she is going to the bathroom  U/S showed retroverted uterus, normal ovaries, 3.12 cc anechoic free fluid in CDS. C/S scar noted.   Pt was seen on 2/3 at urgent care and had negative STD's and vaginosis panel  U/A normal today  Denies vaginal discharge or dysuria   She states she has normal bowel movements   Her menses are regular and denies painful periods or painful IC  Pt of Dr. Thomson      No Additional Complaints Reported    The following portions of the patient's history were reviewed and updated as appropriate:vital signs, allergies, current medications, past medical history, past social history, past surgical history and problem list      Review of Systems   A comprehensive review of systems was negative except for: Genitourinary: positive for pelvic pressure     Objective      /72   Ht 165.1 cm (65\")   Wt 94.8 kg (209 lb)   LMP 01/15/2021 (Exact Date)   BMI 34.78 kg/m²     Physical Exam    General:   alert and no distress   Heart: Not performed today   Lungs: Not performed today.   Breast: Not performed today   Neck: na   Abdomen: {soft, non-tender. Bowel sounds normal. No masses,  no organomegaly   CVA: absent   Pelvis: External genitalia: normal general appearance  Urinary system: urethral meatus normal  Vaginal: normal mucosa without prolapse or lesions, normal without tenderness, induration or masses and normal rugae  Cervix: normal appearance   Extremities: Not performed today   Neurologic: negative   Psychiatric: Normal affect, judgement, and mood       Lab Review   Labs: U/A - normal     Imaging "   Ultrasound - Pelvic Vaginal    Assessment/Plan     ASSESSMENT  1. Pelvic pressure in female    2. Screening for blood or protein in urine        PLAN  1.   Orders Placed This Encounter   Procedures   • POC Urinalysis Dipstick       2. Medications prescribed this encounter:      No orders of the defined types were placed in this encounter.      3. U/s reassuring. U/A negative. Pt had negative STD screening at her primary care as well as urgent care. Discussed with patient that I recommend following up with PCP for possible gluten allergy testing or other food sensitivity testing and/or referral to GI for further evaluation of symptoms.     Follow up: For annual exam     CRISTIAN Chavez  2/5/2021

## 2022-04-08 ENCOUNTER — OFFICE VISIT (OUTPATIENT)
Dept: OBSTETRICS AND GYNECOLOGY | Age: 27
End: 2022-04-08

## 2022-04-08 VITALS
SYSTOLIC BLOOD PRESSURE: 100 MMHG | HEIGHT: 65 IN | DIASTOLIC BLOOD PRESSURE: 62 MMHG | BODY MASS INDEX: 34.39 KG/M2 | WEIGHT: 206.4 LBS

## 2022-04-08 DIAGNOSIS — N89.8 VAGINAL ODOR: Primary | ICD-10-CM

## 2022-04-08 LAB
B-HCG UR QL: NEGATIVE
EXPIRATION DATE: NORMAL
INTERNAL NEGATIVE CONTROL: NEGATIVE
INTERNAL POSITIVE CONTROL: POSITIVE
Lab: NORMAL

## 2022-04-08 PROCEDURE — 81025 URINE PREGNANCY TEST: CPT | Performed by: OBSTETRICS & GYNECOLOGY

## 2022-04-08 PROCEDURE — 99213 OFFICE O/P EST LOW 20 MIN: CPT | Performed by: OBSTETRICS & GYNECOLOGY

## 2022-04-08 RX ORDER — MELOXICAM 15 MG/1
15 TABLET ORAL DAILY
COMMUNITY
Start: 2022-03-20

## 2022-04-08 NOTE — PROGRESS NOTES
"Chief complaint: recurrent BV    HPI  Yen Seymour is a 26 y.o. female presents for evaluation of vaginal odor. She reports she has been seeing her primary MD for annual exam and intermittent treatment of BV. She reports her last pap was November 2021 and normal. She reports she has symptoms of discharge and odor off/on. She denies excessive discharge today but still notes odor. She is sexually active and using condoms and ocp's. She denies irregular bleeding. Her primary MD records are not available today. She notes she went there last week and had a neg test for BV.           The following portions of the patient's history were reviewed and updated as appropriate: allergies, current medications, past family history, past medical history, past social history, past surgical history and problem list.    Review of Systems  Constitutional: negative for chills and fevers  Gastrointestinal: negative for nausea and vomiting  Genitourinary:negative for vaginal discharge or irregular bleeding; positive for vaginal odor    /62   Ht 165.1 cm (65\")   Wt 93.6 kg (206 lb 6.4 oz)   LMP 03/13/2022 (Exact Date)   Breastfeeding No   BMI 34.35 kg/m²         Physical Exam  Constitutional:       Appearance: Normal appearance.   Pulmonary:      Effort: Pulmonary effort is normal.   Genitourinary:     Comments: Normal external female genitalia. Normal vagina with white discharge. Cervix is without lesions. Nuswab obtained.   Neurological:      General: No focal deficit present.      Mental Status: She is alert and oriented to person, place, and time.   Psychiatric:         Mood and Affect: Mood normal.         Behavior: Behavior normal.             Diagnoses and all orders for this visit:    1. Vaginal odor (Primary)  -     NuSwab VG+ - Swab, Vagina      Discussed activity modification to decrease recurrence of BV. Will repeat swab today. Recommend she sign records release from primary MD to document frequency and treatment " medications as pt reports multiple different medications used. Discussed option of suppressive therapy if symptoms/infections recur after lifestyle changes. Pt notes understanding.     Pt declines annual exam noting she had this and normal pap with primary MD in November. Recommend she sign records release.

## 2022-04-11 LAB
A VAGINAE DNA VAG QL NAA+PROBE: ABNORMAL SCORE
BVAB2 DNA VAG QL NAA+PROBE: ABNORMAL SCORE
C ALBICANS DNA VAG QL NAA+PROBE: NEGATIVE
C GLABRATA DNA VAG QL NAA+PROBE: NEGATIVE
C TRACH DNA VAG QL NAA+PROBE: NEGATIVE
MEGA1 DNA VAG QL NAA+PROBE: ABNORMAL SCORE
N GONORRHOEA DNA VAG QL NAA+PROBE: NEGATIVE
T VAGINALIS DNA VAG QL NAA+PROBE: NEGATIVE

## 2022-04-11 RX ORDER — METRONIDAZOLE 500 MG/1
500 TABLET ORAL 2 TIMES DAILY
Qty: 14 TABLET | Refills: 0 | Status: SHIPPED | OUTPATIENT
Start: 2022-04-11 | End: 2022-04-18

## 2023-05-26 ENCOUNTER — OFFICE VISIT (OUTPATIENT)
Dept: OBSTETRICS AND GYNECOLOGY | Age: 28
End: 2023-05-26
Payer: COMMERCIAL

## 2023-05-26 VITALS
DIASTOLIC BLOOD PRESSURE: 88 MMHG | SYSTOLIC BLOOD PRESSURE: 126 MMHG | HEIGHT: 65 IN | WEIGHT: 226.8 LBS | BODY MASS INDEX: 37.79 KG/M2

## 2023-05-26 DIAGNOSIS — N89.8 VAGINAL ODOR: Primary | ICD-10-CM

## 2023-05-26 NOTE — PROGRESS NOTES
"Chief Complaint   Patient presents with   • Follow-up     Gyn F/U for Abnormal Odor        HPI  Yen Seymour is a 27 y.o. female presents for evaluation of vaginal odor like ammonia. She notes she has continued to see her primary MD for annual exams. She reports her last annual was Jan 31, 2023 and that her pap was normal. She notes she did see her primary MD regarding the odor last month and was told her evaluation was negative. She denies vaginal discharge. She denies fever / chills. She notes the odor is present constantly. She is sexually active, not in relationship at this time. Records from her annual and recent visit to primary MD are not available.         The following portions of the patient's history were reviewed and updated as appropriate: allergies, current medications, past family history, past medical history, past social history, past surgical history and problem list.    Review of Systems  Pertinent items are noted in HPI.    /88   Ht 165.1 cm (65\")   Wt 103 kg (226 lb 12.8 oz)   LMP 05/12/2023 (Exact Date)   BMI 37.74 kg/m²         Physical Exam  Constitutional:       Appearance: Normal appearance.   Pulmonary:      Effort: Pulmonary effort is normal.   Genitourinary:     Comments: Normal external female genitalia. Vagina with scant white discharge. No lesions noted. Cervix without lesions or CMT.   Neurological:      General: No focal deficit present.      Mental Status: She is alert and oriented to person, place, and time.   Psychiatric:         Mood and Affect: Mood normal.         Behavior: Behavior normal.             Diagnoses and all orders for this visit:    1. Vaginal odor (Primary)  -     NuSwab VG+ - Swab, Vagina      Advised pt to call if she does not receive results of swab within 1 week.           "

## 2023-06-02 ENCOUNTER — TELEPHONE (OUTPATIENT)
Dept: OBSTETRICS AND GYNECOLOGY | Age: 28
End: 2023-06-02

## 2023-06-02 RX ORDER — METRONIDAZOLE 500 MG/1
500 TABLET ORAL 2 TIMES DAILY
Qty: 14 TABLET | Refills: 0 | Status: SHIPPED | OUTPATIENT
Start: 2023-06-02 | End: 2023-06-09

## 2023-06-02 NOTE — TELEPHONE ENCOUNTER
Called pt and reviewed her swab results. Discussed BV noted and reviewed treatment options. She will proceed with flagyl. Discussed need to avoid alcohol while taking medication and she notes understanding. rx sent to pharmacy with instructions.

## 2023-07-05 ENCOUNTER — TELEPHONE (OUTPATIENT)
Dept: OBSTETRICS AND GYNECOLOGY | Age: 28
End: 2023-07-05

## 2023-07-05 NOTE — TELEPHONE ENCOUNTER
DELETE AFTER REVIEWING: Telephone encounter to be sent to the clinical pool.    Caller: DEDRA     Relationship: SELF     Best call back number: [phone]    What form or medical record are you requesting: MEDICAL RECORDS     Who is requesting this form or medical record from you: SEB    How would you like to receive the form or medical records (pick-up, mail, fax): [id number]  If fax, what is the fax number: [phone]  If mail, what is the addreSS  If pick-up, provide patient with address and location details    Timeframe paperwork needed: ASAP    Additional notes: PT STATED SHE SIGNED THE MEDICAL RELEASE FORM FOR REQUESTING MEDICAL RECORDS         DELETE AFTER READING TO PATIENT:   “Thank you for sharing this information with me. I will send a message to the team. Please allow 48 hours for the staff to follow up on this request.”     “If this is a medical records request, please allow up to 30 days from the time the signed release form is received to process this request.”

## 2023-07-05 NOTE — TELEPHONE ENCOUNTER
Caller: Yen Seymour    Relationship to patient: Self    Best call back number: [phone]    Patient is needing: PT CALLED BACK TO CLARIFY THAT SHE IS GOING TO HER PREVIOUS PROVIDER TO GET HER MEDICAL RECORDS TO BRING TO OFFICE - SHE DOES NOT NEED HER RECORDS SENT FROM OUR OFFICE OR NEED A FORM - SHE IS GOOD.    THANK YOU

## 2023-09-19 ENCOUNTER — OFFICE VISIT (OUTPATIENT)
Dept: OBSTETRICS AND GYNECOLOGY | Age: 28
End: 2023-09-19
Payer: COMMERCIAL

## 2023-09-19 VITALS
SYSTOLIC BLOOD PRESSURE: 110 MMHG | HEIGHT: 65 IN | BODY MASS INDEX: 37.65 KG/M2 | WEIGHT: 226 LBS | DIASTOLIC BLOOD PRESSURE: 68 MMHG

## 2023-09-19 DIAGNOSIS — Z11.3 SCREEN FOR STD (SEXUALLY TRANSMITTED DISEASE): ICD-10-CM

## 2023-09-19 DIAGNOSIS — N89.8 VAGINAL DISCHARGE: Primary | ICD-10-CM

## 2023-09-19 DIAGNOSIS — N89.8 VAGINAL ITCHING: ICD-10-CM

## 2023-09-19 RX ORDER — FLUCONAZOLE 150 MG/1
150 TABLET ORAL DAILY
Qty: 2 TABLET | Refills: 0 | Status: SHIPPED | OUTPATIENT
Start: 2023-09-19

## 2023-09-19 RX ORDER — ACYCLOVIR 400 MG/1
1 TABLET ORAL DAILY
COMMUNITY
Start: 2023-07-08

## 2023-09-19 RX ORDER — NORETHINDRONE ACETATE AND ETHINYL ESTRADIOL 1MG-20(21)
KIT ORAL
COMMUNITY
Start: 2023-09-18

## 2023-09-19 NOTE — PROGRESS NOTES
"Subjective     Chief Complaint   Patient presents with    Vaginal Discharge     Pt c/o vaginal itching and discomfort       Yen Seymour is a 28 y.o.  whose LMP is Patient's last menstrual period was 2023 (exact date). presents with started noticing itching and irritation yesterday she reports it is watery no odor reported also having some itching on her inner thighs last pap reported last year in January she Is wanting to start having AE with this office     The following portions of the patient's history were reviewed and updated as appropriate:vital signs, allergies, current medications, past medical history, past social history, past surgical history, and problem list      Review of Systems   Pertinent items are noted in HPI.     Objective      /68   Ht 165.1 cm (65\")   Wt 103 kg (226 lb)   LMP 2023 (Exact Date)   BMI 37.61 kg/m²     Physical Exam    General:   alert   Heart: Not performed today   Lungs: Not performed today.   Breast: Not performed today   Neck: Not performed today   Abdomen: Not performed today   CVA: Not performed today   Pelvis: Vulva and vagina appear normal. Bimanual exam reveals normal uterus and adnexa.  External genitalia: normal general appearance  Vaginal: normal mucosa without prolapse or lesions and discharge, white and green curdy  Cervix: normal appearance   Extremities: Extremities normal, atraumatic, no cyanosis or edema   Neurologic: AOx3. Gait normal. Reflexes and motor strength normal and symmetric. Cranial nerves 2-12 and sensation grossly intact.   Psychiatric: Normal affect, judgement, and mood       Lab Review   Labs: No data reviewed    Imaging   No data reviewed    Assessment & Plan     ASSESSMENT  1. Vaginal discharge    2. Screen for STD (sexually transmitted disease)    3. Vaginal itching          PLAN  1.   Orders Placed This Encounter   Procedures    NuSwab VG+ - Swab, Vagina       2. Medications prescribed this encounter:        New " Medications Ordered This Visit   Medications    fluconazole (Diflucan) 150 MG tablet     Sig: Take 1 tablet by mouth Daily. May take additional dose in 72 hrs     Dispense:  2 tablet     Refill:  0       3. Swab collected will call with results I sent in diflucan based on s/s and exam we discussed silicone based skin protector to inner thighs as needed for irritation all questions answered and addressed    Follow up: as needed plan AE in January she would like to see Dr. Thomson again    Angelica Fatima, APRN  9/19/2023

## 2023-09-22 LAB
A VAGINAE DNA VAG QL NAA+PROBE: ABNORMAL SCORE
BVAB2 DNA VAG QL NAA+PROBE: ABNORMAL SCORE
C ALBICANS DNA VAG QL NAA+PROBE: POSITIVE
C GLABRATA DNA VAG QL NAA+PROBE: NEGATIVE
C TRACH DNA VAG QL NAA+PROBE: NEGATIVE
MEGA1 DNA VAG QL NAA+PROBE: ABNORMAL SCORE
N GONORRHOEA DNA VAG QL NAA+PROBE: NEGATIVE
T VAGINALIS DNA VAG QL NAA+PROBE: NEGATIVE

## 2023-10-10 ENCOUNTER — OFFICE VISIT (OUTPATIENT)
Dept: OBSTETRICS AND GYNECOLOGY | Age: 28
End: 2023-10-10
Payer: COMMERCIAL

## 2023-10-10 VITALS
WEIGHT: 223.4 LBS | BODY MASS INDEX: 37.22 KG/M2 | SYSTOLIC BLOOD PRESSURE: 110 MMHG | HEIGHT: 65 IN | DIASTOLIC BLOOD PRESSURE: 68 MMHG

## 2023-10-10 DIAGNOSIS — N89.8 VAGINAL ODOR: ICD-10-CM

## 2023-10-10 DIAGNOSIS — N89.8 VAGINAL DISCHARGE: Primary | ICD-10-CM

## 2023-10-10 DIAGNOSIS — Z11.3 ROUTINE SCREENING FOR STI (SEXUALLY TRANSMITTED INFECTION): ICD-10-CM

## 2023-10-10 RX ORDER — METRONIDAZOLE 7.5 MG/G
GEL VAGINAL NIGHTLY
Qty: 70 G | Refills: 0 | Status: SHIPPED | OUTPATIENT
Start: 2023-10-10 | End: 2023-10-10 | Stop reason: SDUPTHER

## 2023-10-10 RX ORDER — METRONIDAZOLE 7.5 MG/G
GEL VAGINAL NIGHTLY
Qty: 70 G | Refills: 0 | Status: SHIPPED | OUTPATIENT
Start: 2023-10-10 | End: 2023-10-15

## 2023-10-12 LAB
A VAGINAE DNA VAG QL NAA+PROBE: NORMAL SCORE
BVAB2 DNA VAG QL NAA+PROBE: NORMAL SCORE
C ALBICANS DNA VAG QL NAA+PROBE: NEGATIVE
C GLABRATA DNA VAG QL NAA+PROBE: NEGATIVE
C TRACH DNA VAG QL NAA+PROBE: NEGATIVE
MEGA1 DNA VAG QL NAA+PROBE: NORMAL SCORE
N GONORRHOEA DNA VAG QL NAA+PROBE: NEGATIVE
T VAGINALIS DNA VAG QL NAA+PROBE: NEGATIVE

## 2024-01-24 ENCOUNTER — OFFICE VISIT (OUTPATIENT)
Dept: OBSTETRICS AND GYNECOLOGY | Age: 29
End: 2024-01-24
Payer: COMMERCIAL

## 2024-01-24 VITALS
DIASTOLIC BLOOD PRESSURE: 70 MMHG | SYSTOLIC BLOOD PRESSURE: 112 MMHG | BODY MASS INDEX: 38.49 KG/M2 | HEIGHT: 65 IN | WEIGHT: 231 LBS

## 2024-01-24 DIAGNOSIS — Z11.3 SCREEN FOR STD (SEXUALLY TRANSMITTED DISEASE): ICD-10-CM

## 2024-01-24 DIAGNOSIS — R35.0 URINARY FREQUENCY: Primary | ICD-10-CM

## 2024-01-24 DIAGNOSIS — N89.8 VAGINAL ODOR: ICD-10-CM

## 2024-01-24 LAB
BILIRUB BLD-MCNC: NEGATIVE MG/DL
CLARITY, POC: CLEAR
COLOR UR: YELLOW
GLUCOSE UR STRIP-MCNC: NEGATIVE MG/DL
KETONES UR QL: NEGATIVE
LEUKOCYTE EST, POC: NEGATIVE
NITRITE UR-MCNC: NEGATIVE MG/ML
PH UR: 5.5 [PH] (ref 5–8)
PROT UR STRIP-MCNC: NEGATIVE MG/DL
RBC # UR STRIP: NEGATIVE /UL
SP GR UR: 1.03 (ref 1–1.03)
UROBILINOGEN UR QL: NORMAL

## 2024-01-24 RX ORDER — METRONIDAZOLE 500 MG/1
500 TABLET ORAL 2 TIMES DAILY
Qty: 14 TABLET | Refills: 0 | Status: SHIPPED | OUTPATIENT
Start: 2024-01-24 | End: 2024-01-31

## 2024-01-24 NOTE — PROGRESS NOTES
"Subjective     Chief Complaint   Patient presents with    Gynecologic Exam     C/o vaginal infection       Yen Seymour is a 28 y.o.  whose LMP is Patient's last menstrual period was 2024 (exact date). presents with vaginal odor    Onset of sxs   Has h/o BV  Denies d/c  Is open to testing for std's    Has noted some mild flank pain on the right  Denies n/v or fever  Not prone to kidney stones  Will check urine    On BCP  Happy with that  Will rtc in 1 month for her annual exam      No Additional Complaints Reported    The following portions of the patient's history were reviewed and updated as appropriate:no additional history reviewed, vital signs, allergies, current medications, past medical history, past social history, past surgical history, and problem list      Review of Systems   Genitourinary:positive for vaginal odor, flank pain     Objective      /70   Ht 165.1 cm (65\")   Wt 105 kg (231 lb)   LMP 2024 (Exact Date)   BMI 38.44 kg/m²     Physical Exam    General:   alert, comfortable, and no distress   Heart: Not performed today   Lungs: Not performed today.   Breast: Not performed today   Neck: Not performed today   Abdomen: Not performed today   CVA: Not performed today   Pelvis: External genitalia: normal general appearance  Vaginal: normal mucosa without prolapse or lesions and discharge, white and scant  Cervix: normal appearance and GC prep obtained  Adnexa: normal bimanual exam  Uterus: normal single, nontender   Extremities: Not performed today   Neurologic: negative   Psychiatric: Normal affect, judgement, and mood       Lab Review   Labs: Urinalysis - with micro negative    Imaging   No data reviewed    Assessment & Plan     ASSESSMENT  1. Urinary frequency    2. Vaginal odor    3. Screen for STD (sexually transmitted disease)          PLAN  1.   Orders Placed This Encounter   Procedures    NuSwab VG+ - Swab, Vagina    Urine Culture - , Urine, Random Void    POC " Urinalysis Dipstick       2. Medications prescribed this encounter:        New Medications Ordered This Visit   Medications    metroNIDAZOLE (Flagyl) 500 MG tablet     Sig: Take 1 tablet by mouth 2 (Two) Times a Day for 7 days.     Dispense:  14 tablet     Refill:  0       3. Start flagyl for possible BV. Will await culture and treat as needed    4. UA reviewed, will send culture. Increase fluid intake. To ER for acutely worsening flank pain with n/v/f    Follow up: 1 month(s)    SARIKA Singh  1/24/2024

## 2024-01-28 LAB
BACTERIA UR CULT: NORMAL
BACTERIA UR CULT: NORMAL

## 2024-01-30 ENCOUNTER — TELEPHONE (OUTPATIENT)
Dept: OBSTETRICS AND GYNECOLOGY | Age: 29
End: 2024-01-30
Payer: COMMERCIAL

## 2024-01-30 RX ORDER — FLUCONAZOLE 150 MG/1
150 TABLET ORAL ONCE
Qty: 1 TABLET | Refills: 0 | Status: SHIPPED | OUTPATIENT
Start: 2024-01-30 | End: 2024-01-30

## 2024-02-13 ENCOUNTER — OFFICE VISIT (OUTPATIENT)
Dept: OBSTETRICS AND GYNECOLOGY | Age: 29
End: 2024-02-13
Payer: COMMERCIAL

## 2024-02-13 VITALS
HEIGHT: 65 IN | BODY MASS INDEX: 37.45 KG/M2 | WEIGHT: 224.8 LBS | DIASTOLIC BLOOD PRESSURE: 70 MMHG | SYSTOLIC BLOOD PRESSURE: 118 MMHG

## 2024-02-13 DIAGNOSIS — Z11.51 ENCOUNTER FOR SCREENING FOR HUMAN PAPILLOMAVIRUS (HPV): ICD-10-CM

## 2024-02-13 DIAGNOSIS — Z30.41 ENCOUNTER FOR SURVEILLANCE OF CONTRACEPTIVE PILLS: ICD-10-CM

## 2024-02-13 DIAGNOSIS — Z01.419 ENCOUNTER FOR GYNECOLOGICAL EXAMINATION: Primary | ICD-10-CM

## 2024-02-13 PROBLEM — N89.8 VAGINAL ITCHING: Status: RESOLVED | Noted: 2023-09-19 | Resolved: 2024-02-13

## 2024-02-13 PROBLEM — Z98.891 PREVIOUS CESAREAN SECTION: Status: ACTIVE | Noted: 2024-02-13

## 2024-02-13 PROBLEM — Z11.3 SCREEN FOR STD (SEXUALLY TRANSMITTED DISEASE): Status: RESOLVED | Noted: 2023-09-19 | Resolved: 2024-02-13

## 2024-02-13 PROBLEM — Z87.51 HISTORY OF PRETERM DELIVERY: Status: ACTIVE | Noted: 2024-02-13

## 2024-02-13 PROBLEM — N89.8 VAGINAL ODOR: Status: RESOLVED | Noted: 2023-10-10 | Resolved: 2024-02-13

## 2024-02-13 PROBLEM — N89.8 VAGINAL DISCHARGE: Status: RESOLVED | Noted: 2023-09-19 | Resolved: 2024-02-13

## 2024-02-13 RX ORDER — NORETHINDRONE ACETATE AND ETHINYL ESTRADIOL 1MG-20(21)
1 KIT ORAL DAILY
Qty: 84 TABLET | Refills: 4 | Status: SHIPPED | OUTPATIENT
Start: 2024-02-13

## 2024-02-15 LAB
CYTOLOGIST CVX/VAG CYTO: NORMAL
CYTOLOGY CVX/VAG DOC CYTO: NORMAL
CYTOLOGY CVX/VAG DOC THIN PREP: NORMAL
DX ICD CODE: NORMAL
HIV 1 & 2 AB SER-IMP: NORMAL
HPV I/H RISK 4 DNA CVX QL PROBE+SIG AMP: NEGATIVE
OTHER STN SPEC: NORMAL
STAT OF ADQ CVX/VAG CYTO-IMP: NORMAL

## 2024-06-11 ENCOUNTER — OFFICE VISIT (OUTPATIENT)
Dept: OBSTETRICS AND GYNECOLOGY | Age: 29
End: 2024-06-11
Payer: COMMERCIAL

## 2024-06-11 VITALS
WEIGHT: 217 LBS | DIASTOLIC BLOOD PRESSURE: 76 MMHG | HEIGHT: 65 IN | SYSTOLIC BLOOD PRESSURE: 116 MMHG | BODY MASS INDEX: 36.15 KG/M2

## 2024-06-11 DIAGNOSIS — R23.2 HOT FLASHES: Primary | ICD-10-CM

## 2024-06-11 DIAGNOSIS — R53.83 OTHER FATIGUE: ICD-10-CM

## 2024-06-11 PROCEDURE — 1160F RVW MEDS BY RX/DR IN RCRD: CPT | Performed by: PHYSICIAN ASSISTANT

## 2024-06-11 PROCEDURE — 99213 OFFICE O/P EST LOW 20 MIN: CPT | Performed by: PHYSICIAN ASSISTANT

## 2024-06-11 PROCEDURE — 1159F MED LIST DOCD IN RCRD: CPT | Performed by: PHYSICIAN ASSISTANT

## 2024-06-11 RX ORDER — DOCUSATE SODIUM 100 MG/1
CAPSULE, LIQUID FILLED ORAL
COMMUNITY
Start: 2024-05-22

## 2024-06-11 RX ORDER — SWAB
1 SWAB, NON-MEDICATED MISCELLANEOUS DAILY
COMMUNITY
Start: 2024-05-26

## 2024-06-11 RX ORDER — ERGOCALCIFEROL 1.25 MG/1
1 CAPSULE ORAL WEEKLY
COMMUNITY
Start: 2024-05-22

## 2024-06-11 NOTE — PROGRESS NOTES
"Subjective     Chief Complaint   Patient presents with    Consult     Consult for hormones       Yen Seymour is a 28 y.o.  whose LMP is Patient's last menstrual period was 2024 (exact date). 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  She is not getting 8 hours of sleep a night      No Additional Complaints Reported    The following portions of the patient's history were reviewed and updated as appropriate:no additional history reviewed, vital signs, allergies, current medications, past medical history, past social history, past surgical history, and problem list      Review of Systems   Behavioral/Psych: positive for fatigue     Objective      /76   Ht 165.1 cm (65\")   Wt 98.4 kg (217 lb)   LMP 2024 (Exact Date)   BMI 36.11 kg/m²     Physical Exam    General:   alert, comfortable, and no distress   Heart: Not performed today   Lungs: Not performed today.   Breast: Not performed today   Neck: Not performed today   Abdomen: Not performed today   CVA: Not performed today   Pelvis: Not performed today   Extremities: Not performed today   Neurologic: negative   Psychiatric: Normal affect, judgement, and mood       Lab Review   Labs: reviewed paper labs-b12 low normal-311 pg/mL, Iron is 56 ug/dL    Imaging   No data reviewed    Assessment & Plan     ASSESSMENT  1. Hot flashes    2. Other fatigue          PLAN  1. Disc lifestyle modifications and " adding PNV and Vit D. Monitor caffeine and push water. Try to get on a regular sleep schedule.      Disc trying alt form of BC-different bcp, iud, nexplanon, patch or ring. She will consider her options and lmk.     Follow up: SARIKA Reyes  6/11/2024

## 2024-06-11 NOTE — ADDENDUM NOTE
Addended by: JOE LINN on: 11/16/2018 09:40 AM     Modules accepted: Orders     Pt now lives in [facility] and has an appointment to establish care with Valeria Carbone 6/14/24.

## 2025-02-23 PROBLEM — R87.610 ASCUS OF CERVIX WITH NEGATIVE HIGH RISK HPV: Status: RESOLVED | Noted: 2019-03-08 | Resolved: 2025-02-23

## 2025-02-23 NOTE — PROGRESS NOTES
Subjective     Chief Complaint   Patient presents with    Gynecologic Exam     Annual. Patient would like to discuss menstrual cycle's.   Last pap - 2024 Neg/HPV Neg       History of Present Illness    Yen Seymour is a 29 y.o.  who presents for annual exam.  Her menses are irregular occurring twice monthly at times over the last few months. She is still on ocp's.     She complains of irregular menstrual bleeding recently. She has light flow but notes some irregular spotting over the last few months. She notes she is not regular always with pill dosing    She also complains of recent rectal bleeding with BM.     Obstetric History:  OB History          3    Para   2    Term   1       1    AB   1    Living   2         SAB        IAB        Ectopic        Molar        Multiple        Live Births   2               Menstrual History:     Patient's last menstrual period was 2025 (exact date).         Current contraception: OCP (estrogen/progesterone)  History of abnormal Pap smear: yes - hx ascus with neg hpv, f/u neg  Received Gardasil immunization: yes  Perform regular self breast exam:  yes  Family history of uterine or ovarian cancer: no  Family History of colon cancer: no  Family history of breast cancer: no    Mammogram: not indicated.  Colonoscopy: ref to colorectal surgery  DEXA: not indicated.    Exercise: moderately active  Calcium/Vitamin D: adequate intake    The following portions of the patient's history were reviewed and updated as appropriate: allergies, current medications, past family history, past medical history, past social history, past surgical history, and problem list.    Review of Systems    Review of Systems   Constitutional: Negative for fatigue.   Respiratory: Negative for shortness of breath.    Gastrointestinal: Positive for rectal bleeding; Negative for abdominal pain.   Genitourinary: Positive for irregular bleeding  Neurological: Negative   "  Psychiatric/Behavioral: Positive for  depressed mood.         Objective   Physical Exam    /70 (BP Location: Right arm, Patient Position: Sitting, Cuff Size: Adult)   Ht 165.1 cm (65\")   Wt 97.2 kg (214 lb 3.2 oz)   LMP 02/12/2025 (Exact Date)   BMI 35.64 kg/m²   General:   Alert, in no distress   Heart: regular rate and rhythm   Lungs: clear to auscultation bilaterally   Breast: Inspection is negative. Left breast is without masses, retractions, nipple discharge or axillary adenopathy. Right breast is without masses, retractions, nipple discharge or axillary adenopathy.     Neck: Supple, no thyromegaly   Abdomen: Soft, no tenderness or guarding   Pelvis: External genitalia: normal general appearance  Urinary system: urethral meatus normal  Vaginal: normal mucosa without prolapse or lesions  Cervix: normal appearance  Adnexa: no masses or tenderness  Uterus: normal, nontender   Extremities: Normal without edema   Neurologic: Alert and oriented   Psychiatric: Normal affect, judgment and mood     Assessment & Plan   Diagnoses and all orders for this visit:    1. Encounter for gynecological examination (Primary)  -     IGP, Apt HPV,rfx 16 / 18,45    2. Screening for HPV (human papillomavirus)  -     IGP, Apt HPV,rfx 16 / 18,45    3. Irregular bleeding  -     Chlamydia trachomatis, Neisseria gonorrhoeae, Trichomonas vaginalis, PCR - Swab, Vagina  -     TSH+Free T4    4. Rectal bleeding  -     Ambulatory Referral to Colorectal Surgery    5. Screening for thyroid disorder  -     TSH+Free T4    Other orders  -     Blisovi FE 1/20 1-20 MG-MCG per tablet; Take 1 tablet by mouth Daily.  Dispense: 84 tablet; Refill: 4        All questions answered.  Breast self exam technique reviewed and patient encouraged to perform self-exam monthly.  Discussed healthy lifestyle modifications.  Recommended 30 minutes of aerobic exercise five times per week.    The risks of combined birth control pills were reviewed with the " patient to include DVT/PE/CVE/HTN/biliary disease and breast cancer. She notes understanding.     Irregular bleeding discussed. Recommend urine HCG today. If negative, discussed need to take pills regularly. Will also f/u pap, thyroid tests and cervical cultures to screen for infections. Recommend she book u/s at f/u visit for further evaluation.     Advised her to call if she does not receive contact to book colonoscopy    Offered referral to counselor regarding depressed mood. Pt declines noting she has a therapist and feels she is addressing.

## 2025-02-24 ENCOUNTER — OFFICE VISIT (OUTPATIENT)
Dept: OBSTETRICS AND GYNECOLOGY | Age: 30
End: 2025-02-24
Payer: COMMERCIAL

## 2025-02-24 VITALS
SYSTOLIC BLOOD PRESSURE: 106 MMHG | HEIGHT: 65 IN | WEIGHT: 214.2 LBS | DIASTOLIC BLOOD PRESSURE: 70 MMHG | BODY MASS INDEX: 35.69 KG/M2

## 2025-02-24 DIAGNOSIS — Z13.29 SCREENING FOR THYROID DISORDER: ICD-10-CM

## 2025-02-24 DIAGNOSIS — Z11.51 SCREENING FOR HPV (HUMAN PAPILLOMAVIRUS): ICD-10-CM

## 2025-02-24 DIAGNOSIS — K62.5 RECTAL BLEEDING: ICD-10-CM

## 2025-02-24 DIAGNOSIS — Z01.419 ENCOUNTER FOR GYNECOLOGICAL EXAMINATION: Primary | ICD-10-CM

## 2025-02-24 DIAGNOSIS — N92.6 IRREGULAR BLEEDING: ICD-10-CM

## 2025-02-24 LAB
B-HCG UR QL: NEGATIVE
EXPIRATION DATE: NORMAL
INTERNAL NEGATIVE CONTROL: NORMAL
INTERNAL POSITIVE CONTROL: NORMAL
Lab: NORMAL
T4 FREE SERPL-MCNC: 1.01 NG/DL (ref 0.92–1.68)
TSH SERPL DL<=0.005 MIU/L-ACNC: 2.33 UIU/ML (ref 0.27–4.2)

## 2025-02-24 RX ORDER — NORETHINDRONE ACETATE AND ETHINYL ESTRADIOL 1MG-20(21)
1 KIT ORAL DAILY
Qty: 84 TABLET | Refills: 4 | Status: SHIPPED | OUTPATIENT
Start: 2025-02-24

## 2025-02-26 LAB
C TRACH RRNA SPEC QL NAA+PROBE: NEGATIVE
N GONORRHOEA RRNA SPEC QL NAA+PROBE: NEGATIVE
T VAGINALIS RRNA SPEC QL NAA+PROBE: NEGATIVE

## 2025-02-27 LAB
CYTOLOGIST CVX/VAG CYTO: NORMAL
CYTOLOGY CVX/VAG DOC CYTO: NORMAL
CYTOLOGY CVX/VAG DOC THIN PREP: NORMAL
DX ICD CODE: NORMAL
HPV I/H RISK 4 DNA CVX QL PROBE+SIG AMP: NEGATIVE
OTHER STN SPEC: NORMAL
SERVICE CMNT-IMP: NORMAL
STAT OF ADQ CVX/VAG CYTO-IMP: NORMAL

## 2025-03-10 ENCOUNTER — OFFICE VISIT (OUTPATIENT)
Dept: SURGERY | Facility: CLINIC | Age: 30
End: 2025-03-10
Payer: COMMERCIAL

## 2025-03-10 VITALS
SYSTOLIC BLOOD PRESSURE: 110 MMHG | WEIGHT: 213 LBS | OXYGEN SATURATION: 99 % | HEART RATE: 96 BPM | HEIGHT: 63 IN | DIASTOLIC BLOOD PRESSURE: 72 MMHG | BODY MASS INDEX: 37.74 KG/M2

## 2025-03-10 DIAGNOSIS — A63.0 CONDYLOMA: Primary | ICD-10-CM

## 2025-03-10 DIAGNOSIS — K62.5 RECTAL BLEEDING: ICD-10-CM

## 2025-03-10 RX ORDER — IMIQUIMOD 12.5 MG/.25G
CREAM TOPICAL
Qty: 12 EACH | Refills: 3 | Status: SHIPPED | OUTPATIENT
Start: 2025-03-10

## 2025-03-10 NOTE — PROGRESS NOTES
History of Present Illness  The patient is a 29-year-old female presenting as a new patient for evaluation of rectal pain and bleeding.    She experienced a 6-day episode of rectal bleeding in February, which was longer than previous episodes. During her annual GYN examination, a small hemorrhoid was identified. She attributes the bleeding to an increase in protein intake without a corresponding increase in fiber. Since then, she has not had any further bleeding episodes. She reports no anal pain but notes that her bowel movements have increased from once to twice daily. Her stool consistency is described as 3 and 2 on the [place] Stool Chart. 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 She is aware of her father's history of colon polyps, which were removed a few months ago at age 61. She has since incorporated two apples into her daily diet and has recently added high-fiber oats and chocolate granola bars. She also consumes cereal with fiber and takes MiraLAX. She does not want to schedule a colonoscopy at this time.    FAMILY HISTORY  Her father had colon polyps removed a couple of months ago at the age of 61.    MEDICATIONS  MiraLAX      Past Medical History:   Diagnosis Date    ASCUS of cervix with negative high risk HPV 2019    Herpes     Multiple gestation 3/18/09,17    Vaginal,     Osteopenia     Patient denies medical problems     Varicella        Past Surgical History:   Procedure Laterality Date     SECTION N/A 2017    Procedure:  SECTION PRIMARY;  Surgeon: Sharon Thomson MD;  Location: [facility] LABOR DELIVERY;  Service:     WISDOM TOOTH EXTRACTION         Social History:   reports that she has quit smoking. Her smoking use included cigars and cigarettes. She has been exposed to tobacco smoke. She has quit using smokeless tobacco. She reports that she does not drink alcohol and does not use  drugs.      Marriage status: Single    Family History   Problem Relation Age of Onset    Schizophrenia Mother     Glaucoma Maternal Grandmother     Breast cancer Neg Hx     Ovarian cancer Neg Hx     Uterine cancer Neg Hx     Colon cancer Neg Hx          Current Outpatient Medications:     Blisovi FE 1/20 1-20 MG-MCG per tablet, Take 1 tablet by mouth Daily., Disp: 84 tablet, Rfl: 4    docusate sodium (COLACE) 100 MG capsule, TAKE ONE CAPSULE BY MOUTH TWICE A DAY AS NEEDED FOR CONSTIPATION, Disp: , Rfl:     Prenatal 28-0.8 MG tablet, Take 1 tablet by mouth Daily., Disp: , Rfl:     vitamin D (ERGOCALCIFEROL) 1.25 MG (96188 UT) capsule capsule, Take 1 capsule by mouth 1 (One) Time Per Week., Disp: , Rfl:     Allergy  Patient has no known allergies.    Vitals:    03/10/25 1439   BP: 110/72   Pulse: 96   SpO2: 99%     Body mass index is 37.73 kg/m².    Physical Exam  No acute distress  Chaperone present  Perianal exam: external hemorrhoids not enlarged. MARÍA ELENA: no masses. Anoscopy performed: some rough texture in several spots circumferentially around anal canal consistent with condyloma. internal hemorrhoids normal.       Assessment & Plan  1. Rectal pain and bleeding.  The patient's symptoms are likely due to constipation-induced irritation of internal hemorrhoids, which have subsequently enlarged and bled.  A speculum examination revealed a slightly bumpy texture, which could be indicative of irritation or condyloma, a manifestation of the HPV virus. However, this is not a cause for immediate concern. She has been advised to maintain a daily fiber intake of 30 to 40 g. In case of constipation, she can increase her MiraLAX dosage to twice daily. Aldara cream has been prescribed for application three times weekly for a duration of 6 weeks. She has been instructed to discontinue use if she experiences any burning sensation and to report back for reevaluation.    Follow-up  The patient will follow up in 6 weeks.        Patient or patient representative verbalized consent for the use of Ambient Listening during the visit with  Lisa Berumen PA-C for chart documentation. 3/10/2025  15:00 EDT    Lisa Berumen PA-C  Physician Assistant  Colorectal Surgery

## 2025-03-11 ENCOUNTER — OFFICE VISIT (OUTPATIENT)
Dept: OBSTETRICS AND GYNECOLOGY | Age: 30
End: 2025-03-11
Payer: COMMERCIAL

## 2025-03-11 VITALS — BODY MASS INDEX: 37.98 KG/M2 | SYSTOLIC BLOOD PRESSURE: 112 MMHG | DIASTOLIC BLOOD PRESSURE: 76 MMHG | WEIGHT: 214.4 LBS

## 2025-03-11 DIAGNOSIS — N92.6 IRREGULAR BLEEDING: Primary | ICD-10-CM

## 2025-03-11 RX ORDER — NORETHINDRONE ACETATE AND ETHINYL ESTRADIOL .03; 1.5 MG/1; MG/1
1 TABLET ORAL DAILY
Qty: 84 TABLET | Refills: 3 | Status: SHIPPED | OUTPATIENT
Start: 2025-03-11

## 2025-03-11 NOTE — PROGRESS NOTES
Chief Complaint   Patient presents with    Follow-up     Follow Up With U/S Today For Irregular Periods. Patient has no other concerns.         HPI  Yen Seymour is a 29 y.o. female is scheduled for f/u visit with u/s due to irreg bleeding on ocp's. Recent pap, cervical screen for STD and thyroid tests have been normal.        The following portions of the patient's history were reviewed and updated as appropriate: allergies, current medications, past family history, past medical history, past social history, past surgical history, and problem list.    Review of Systems  Pertinent items are noted in HPI.    /76 (BP Location: Right arm, Patient Position: Sitting, Cuff Size: Adult)   Wt 97.3 kg (214 lb 6.4 oz)   LMP 02/12/2025 (Exact Date)   BMI 37.98 kg/m²         Physical Exam  Constitutional:       Appearance: Normal appearance.   Pulmonary:      Effort: Pulmonary effort is normal.   Neurological:      General: No focal deficit present.      Mental Status: She is alert and oriented to person, place, and time.   Psychiatric:         Mood and Affect: Mood normal.         Behavior: Behavior normal.         Tv u/s: normal uterus, endometrium thin at 1.4 mm. Right ovary with simple 1.9 cm follicular cyst. Normal left ovary.      Diagnoses and all orders for this visit:    1. Irregular bleeding (Primary)    Other orders  -     Norethindrone Acet-Ethinyl Est 1.5-30 MG-MCG tablet; Take 1 tablet by mouth Daily.  Dispense: 84 tablet; Refill: 3      Discussed irregular bleeding likely related to thin endometrium on low dose ocp's. Reviewed options and pt would like to try higher estrogen dose pill. Sent rx with instructions. The risks of combined birth control pills were reviewed with the patient to include DVT/PE/CVE/HTN/biliary disease and breast cancer. She notes understanding.

## 2025-03-14 ENCOUNTER — PATIENT ROUNDING (BHMG ONLY) (OUTPATIENT)
Dept: SURGERY | Facility: CLINIC | Age: 30
End: 2025-03-14
Payer: COMMERCIAL

## 2025-03-14 NOTE — PROGRESS NOTES
March 14, 2025    Hello, may I speak with Yen Seymour?    My name is Wilian      I am  with [facility]  [address] [facility] [address]  [facility] [address] [phone].    Before we get started may I verify your date of birth? 1995    I am calling to officially welcome you to our practice and ask about your recent visit. Is this a good time to talk? no    Tell me about your visit with us. What things went well?  everything went well.        We're always looking for ways to make our patients' experiences even better. Do you have recommendations on ways we may improve?  no    Overall were you satisfied with your first visit to our practice? yes       I appreciate you taking the time to speak with me today. Is there anything else I can do for you? no      Thank you, and have a great day.

## 2025-04-22 ENCOUNTER — OFFICE VISIT (OUTPATIENT)
Dept: SURGERY | Facility: CLINIC | Age: 30
End: 2025-04-22
Payer: COMMERCIAL

## 2025-04-22 VITALS
DIASTOLIC BLOOD PRESSURE: 50 MMHG | HEART RATE: 78 BPM | BODY MASS INDEX: 40.03 KG/M2 | WEIGHT: 225.9 LBS | HEIGHT: 63 IN | SYSTOLIC BLOOD PRESSURE: 100 MMHG | OXYGEN SATURATION: 99 %

## 2025-04-22 DIAGNOSIS — K62.5 RECTAL BLEEDING: ICD-10-CM

## 2025-04-22 DIAGNOSIS — A63.0 CONDYLOMA: Primary | ICD-10-CM

## 2025-04-22 PROCEDURE — 1160F RVW MEDS BY RX/DR IN RCRD: CPT | Performed by: PHYSICIAN ASSISTANT

## 2025-04-22 PROCEDURE — 99214 OFFICE O/P EST MOD 30 MIN: CPT | Performed by: PHYSICIAN ASSISTANT

## 2025-04-22 PROCEDURE — 46600 DIAGNOSTIC ANOSCOPY SPX: CPT | Performed by: PHYSICIAN ASSISTANT

## 2025-04-22 PROCEDURE — 1159F MED LIST DOCD IN RCRD: CPT | Performed by: PHYSICIAN ASSISTANT

## 2025-04-22 PROCEDURE — 3015F CERV CANCER SCREEN DOCD: CPT | Performed by: PHYSICIAN ASSISTANT

## 2025-04-22 NOTE — PROGRESS NOTES
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    She reports an improvement in her bowel movements, attributing this to an increased intake of fiber. She has been supplementing her diet with fiber capsules and consuming high-fiber oatmeal every morning. She notes that discontinuation of these practices led to a recurrence of bleeding, which resolved upon resumption. Her bowel movements have increased to twice daily since the augmentation of her fiber intake. She does not experience frequent straining during defecation and reports no associated pain or bleeding. 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 She was unable to utilize the prescribed cream due to the unavailability of finger cots for internal application. Instead, she attempted to administer the treatment by squeezing a small amount of suppository into her rectum, but only managed to use it three times prior to forgetting about the appointment. She recalls an abnormal Pap smear result post-partum.     /50 (BP Location: Left arm, Patient Position: Sitting, Cuff Size: Large Adult)   Pulse 78   Ht 160 cm (63\")   Wt 102 kg (225 lb 14.4 oz)   LMP 04/09/2025 (Exact Date)   SpO2 99%   Breastfeeding No   BMI 40.02 kg/m²   Body mass index is 40.02 kg/m².  -  Physical Exam  No acute distress  Chaperone present  Perianal exam: external hemorrhoids not enlarged. No external condyloma. Small anterior midline anal skin tag. MARÍA ELENA: no masses. Anoscopy performed:  internal hemorrhoids not enlarged. Slightly bumpy appearance of anal canal, though appears less severe compared to last exam.      Assessment & Plan  1. Condyloma.  Her condition has shown improvement with no current pain or bleeding. The fiber intake has been beneficial in managing her symptoms. An anal Pap smear was conducted today to check " for any abnormal or potentially precancerous cells. She is advised to continue using the cream internally within the anal canal, three times a week, for up to 16 weeks. The results of the Pap smear will be communicated to her upon receipt.    PROCEDURE  An anal Pap smear was performed today.  A premoistened synthetic swab was inserted into the anal canal and then lateral pressure was applied as the swab was spun and cells were collected.  The swab was then removed and the specimen was put into the ThinPrep medium.  The patient tolerated the procedure well.       Patient or patient representative verbalized consent for the use of Ambient Listening during the visit with  Lisa Berumen PA-C for chart documentation. 4/22/2025  09:42 EDT    Lisa Berumen PA-C  Physician Assistant  Colorectal Surgery

## 2025-05-09 ENCOUNTER — TELEPHONE (OUTPATIENT)
Dept: OBSTETRICS AND GYNECOLOGY | Age: 30
End: 2025-05-09

## 2025-05-09 RX ORDER — NORETHINDRONE ACETATE AND ETHINYL ESTRADIOL .03; 1.5 MG/1; MG/1
1 TABLET ORAL DAILY
Qty: 28 TABLET | Refills: 11 | Status: SHIPPED | OUTPATIENT
Start: 2025-05-09

## 2025-05-09 NOTE — TELEPHONE ENCOUNTER
,     Patient had appointment with you back on 3/11/25 . She was prescribed BCP to control her periods . The birth control pack she picked up at the pharmacy come only with 21 active pills that requires a 7 day break in between packs . She would like the 28 day pack with 7 day reminder pill phase . She is sexually active and taking daily pill will help her to  remind to take them , she is worried to get pregnant .

## 2025-05-09 NOTE — TELEPHONE ENCOUNTER
Please call pt and pharmacy. She was prescribed a 3 month supply of 84 pills which would be pack of 28. Pharmacy provided incorrect pack.

## 2025-05-14 ENCOUNTER — TELEPHONE (OUTPATIENT)
Dept: OBSTETRICS AND GYNECOLOGY | Age: 30
End: 2025-05-14
Payer: COMMERCIAL

## 2025-05-14 NOTE — PROGRESS NOTES
Chief Complaint   Patient presents with    Follow-up     Patient would like to discuss birth control option's, she had headache's with her recent method.         EMELY Seymour is a 29 y.o. female is scheduled for f/u gyn visit. Patient reports she started these pills in March and did not have headaches until this past Sunday. She notes she took an allergy pill and her headache resolved.     She also notes she was concerned because the pharmacy gave her 21 day pill pack initially. She notes she is concerned regarding staying on a schedule with taking pills regularly. They gave her a 28 day pill pack this month and she likes this better.         The following portions of the patient's history were reviewed and updated as appropriate: allergies, current medications, past family history, past medical history, past social history, past surgical history, and problem list.    Review of Systems  Neuro: pos for recent headaches now resolved. Neg for aura     /62 (BP Location: Right arm, Patient Position: Sitting, Cuff Size: Adult)   Wt 99.8 kg (220 lb)   LMP 05/10/2025 (Exact Date)   BMI 38.97 kg/m²         Physical Exam  Constitutional:       Appearance: Normal appearance.   Pulmonary:      Effort: Pulmonary effort is normal.   Neurological:      Mental Status: She is alert.   Psychiatric:         Mood and Affect: Mood normal.         Behavior: Behavior normal.             Diagnoses and all orders for this visit:    1. Birth control counseling (Primary)  -     POC Pregnancy, Urine      Headache resolved with allergy treatment. Pt notes she did not have headaches with initial use of this pill and she wants to continue with current rx. Provided information regarding contraceptive options and encouraged her to consider change to method does not contain estrogen if headaches return. She notes agreement.     The risks of combined birth control pills were reviewed with the patient to include DVT/PE/CVE/HTN/biliary  disease and breast cancer. She notes understanding.     22 min spent with pt reviewing recent hx and providing counseling

## 2025-05-14 NOTE — TELEPHONE ENCOUNTER
Please call pt and recommend she stop ocp's due to headaches. She will need to abstain or use condoms. Recommend she schedule a f/u visit to discuss alternative contraception.

## 2025-05-16 ENCOUNTER — OFFICE VISIT (OUTPATIENT)
Dept: OBSTETRICS AND GYNECOLOGY | Age: 30
End: 2025-05-16
Payer: COMMERCIAL

## 2025-05-16 VITALS — WEIGHT: 220 LBS | DIASTOLIC BLOOD PRESSURE: 62 MMHG | BODY MASS INDEX: 38.97 KG/M2 | SYSTOLIC BLOOD PRESSURE: 102 MMHG

## 2025-05-16 DIAGNOSIS — Z30.09 BIRTH CONTROL COUNSELING: Primary | ICD-10-CM

## 2025-05-16 LAB
B-HCG UR QL: NEGATIVE
EXPIRATION DATE: NORMAL
INTERNAL NEGATIVE CONTROL: NORMAL
INTERNAL POSITIVE CONTROL: NORMAL
Lab: NORMAL

## (undated) DEVICE — GLV SURG BIOGEL LTX PF 6 1/2

## (undated) DEVICE — SOL IRR H2O BTL 1000ML STRL

## (undated) DEVICE — SUT VIC 0 CT1 36IN J946H

## (undated) DEVICE — SUT VIC 3/0 CTI 36IN J944H

## (undated) DEVICE — 3M™ MEDIPORE™ H SOFT CLOTH SURGICAL TAPE 2864, 4 INCH X 10 YARD (10CM X 9,14M), 12 ROLLS/CASE: Brand: 3M™ MEDIPORE™

## (undated) DEVICE — SUT MNCRYL PLS ANTIB UD 4/0 PS2 18IN

## (undated) DEVICE — SUT VIC 0 CT 36IN J958H